# Patient Record
Sex: MALE | Race: WHITE | NOT HISPANIC OR LATINO | Employment: UNEMPLOYED | RURAL
[De-identification: names, ages, dates, MRNs, and addresses within clinical notes are randomized per-mention and may not be internally consistent; named-entity substitution may affect disease eponyms.]

---

## 2017-01-30 RX ORDER — FENOFIBRIC ACID 135 MG/1
135 CAPSULE, DELAYED RELEASE ORAL DAILY
Qty: 30 CAPSULE | Refills: 11 | Status: SHIPPED | OUTPATIENT
Start: 2017-01-30 | End: 2017-08-29

## 2017-01-31 ENCOUNTER — OFFICE VISIT (OUTPATIENT)
Dept: CARDIOLOGY | Facility: CLINIC | Age: 77
End: 2017-01-31

## 2017-01-31 VITALS
SYSTOLIC BLOOD PRESSURE: 130 MMHG | BODY MASS INDEX: 31.48 KG/M2 | HEIGHT: 64 IN | HEART RATE: 76 BPM | WEIGHT: 184.4 LBS | DIASTOLIC BLOOD PRESSURE: 81 MMHG

## 2017-01-31 DIAGNOSIS — I10 ESSENTIAL HYPERTENSION: ICD-10-CM

## 2017-01-31 DIAGNOSIS — E78.5 HYPERLIPIDEMIA LDL GOAL <100: ICD-10-CM

## 2017-01-31 DIAGNOSIS — I25.10 CORONARY ARTERY DISEASE INVOLVING NATIVE CORONARY ARTERY OF NATIVE HEART WITHOUT ANGINA PECTORIS: Primary | ICD-10-CM

## 2017-01-31 PROCEDURE — 99213 OFFICE O/P EST LOW 20 MIN: CPT | Performed by: INTERNAL MEDICINE

## 2017-01-31 RX ORDER — LORATADINE 10 MG/1
10 TABLET ORAL DAILY
COMMUNITY

## 2017-01-31 RX ORDER — ROSUVASTATIN CALCIUM 20 MG/1
20 TABLET, COATED ORAL DAILY
Qty: 30 TABLET | Refills: 6 | Status: SHIPPED | OUTPATIENT
Start: 2017-01-31 | End: 2017-08-29

## 2017-01-31 NOTE — PROGRESS NOTES
Encounter Date:01/31/2017      Patient ID: Chino Bailey is a 76 y.o. male.    Chief Complaint: Coronary Artery Disease; Hypertension; and Hyperlipidemia    PROBLEM LIST:    1.  Coronary artery disease:  a. Anterolateral ST segment elevation  MI in September 2004.  b. Cardiac catheterization showed ejection fraction of 50% with moderate inferior hypokinesis, 80% mid LAD stenosis with 95% first diagonal stenosis, 70% obtuse marginal and 70% mid to distal circumflex.  c. Stenting of the first  diagonal with 2.5 x 13 mm CYPHER stent and stenting of the LAD with 3.0 x 18 mm CYPHER stent by Dr. Mireles on 17 September, 2004.  d. Stenting of the distal circumflex with 2.5 x 24 TAXUS stent with stenting of the first marginal with 2.5 x 20 mm TAXUS  stent by Dr. Johnson on 5 October, 2004.  e. Exercise Cardiolite stress test in March 2006 was negative for ischemia and showed ejection fraction of 64%.  f. Cardiac catheterization by Dr. Johnson on 06/21/2007 showed 70% focal mid-segment LAD stenosis with  99% stenosis of the small proximal right PDA, ejection fraction is normal, all previous stents were patent.  g. Subsequent stenting of the mid LAD with 3.0 x 8 TAXUS stent and PDA was treated with balloon angioplasty with excellent results.  h. Stable  angina.  i. Cardiolite stress test on 03/22/2011:  Negative for ischemia and showed EF of 69%.    j. Recurrent angina with hospital presentation to [facility]; MI excluded.  k. Cardiolite stress test on 11/19/2013:  Negative for ischemia and  showed normal LV function.    l. Cardiac catheterization, with presentation of UA 12/15/16: Severe one vessel CAD. 95% stenosis of first OM branch of LCx. Moderate disease of LAD and RCA., LCx. BETH to first OM (Xience 2.5 x 12 mm)  2. Benign HTN.  3. Mixed hypercholesterolemia  with intolerance to statin, subhash jaundice, due to elevated LFTs.  4. Osteoarthritis.  5. Seasonal allergies.  6. GERD.  7. Status post  cholecystectomy.  8. Proteinuria.  9. Chronic kidney disease, stage 3.    History of Present Illness  Patient presents today status post cardiac catheterization.  Since last visit has been feeling much better. Denies chest pain, shortness of breath, leg swelling, palpitations, and syncope.  Intolerant to previous statin therapy.  Remains busy and active.  That his insurance is refusing to pay for Trilipix.  He has had previous problems with Lipitor but has not tried any other statins.    Allergies   Allergen Reactions   • Statins Other (See Comments)     Skin turned yellow per p,t jaundice         Current Outpatient Prescriptions:   •  aspirin 81 MG chewable tablet, Chew 1 tablet Daily for 360 days., Disp: 90 tablet, Rfl: 3  •  clopidogrel (PLAVIX) 75 MG tablet, Take 1 tablet by mouth Daily for 360 days., Disp: 90 tablet, Rfl: 3  •  esomeprazole (NexIUM) 40 MG capsule, Take 40 mg by mouth every morning before breakfast., Disp: , Rfl:   •  ezetimibe (ZETIA) 10 MG tablet, Take 1 tablet by mouth daily., Disp: 30 tablet, Rfl: 11  •  fenofibric acid (TRILIPIX) 135 MG capsule delayed-release delayed release capsule, Take 1 capsule by mouth Daily., Disp: 30 capsule, Rfl: 11  •  fluticasone (FLONASE) 50 MCG/ACT nasal spray, 2 sprays into each nostril daily. Administer 2 sprays in each nostril for each dose., Disp: , Rfl:   •  hydrOXYzine (VISTARIL) 50 MG capsule, Take 50 mg by mouth 2 (two) times a day., Disp: , Rfl:   •  isosorbide mononitrate (IMDUR) 30 MG 24 hr tablet, Take 1 tablet by mouth daily., Disp: 30 tablet, Rfl: 11  •  loratadine (CLARITIN) 10 MG tablet, Take 10 mg by mouth Daily., Disp: , Rfl:   •  metoprolol tartrate (LOPRESSOR) 50 MG tablet, Take 1 tablet by mouth 2 (two) times a day., Disp: 60 tablet, Rfl: 11  •  niacin (NIASPAN) 500 MG CR tablet, Take 1 tablet by mouth every night., Disp: 30 tablet, Rfl: 1  •  nisoldipine (SULAR) 8.5 MG 24 hr tablet, Take 1 tablet by mouth daily., Disp: 30 tablet, Rfl:  "11  •  nitroglycerin (NITROSTAT) 0.4 MG SL tablet, Place 1 tablet under the tongue Every 5 (Five) Minutes As Needed for chest pain. Take no more than 3 doses in 15 minutes., Disp: 30 tablet, Rfl: 12  •  rosuvastatin (CRESTOR) 20 MG tablet, Take 1 tablet by mouth Daily., Disp: 30 tablet, Rfl: 6    The following portions of the patient's history were reviewed and updated as appropriate: allergies, current medications, past family history, past medical history, past social history, past surgical history and problem list.    Review of Systems   Constitution: Negative for chills, fever, weight gain and weight loss.   Cardiovascular: Negative for chest pain, claudication, dyspnea on exertion, leg swelling, orthopnea, palpitations, paroxysmal nocturnal dyspnea and syncope.        No dizziness   Gastrointestinal: Negative for abdominal pain, constipation, diarrhea, nausea and vomiting.   Genitourinary:        No urinary symptoms   Neurological:        No symptoms of stroke.   All other systems reviewed and are negative.    Objective:     Blood pressure 130/81, pulse 76, height 64\" (162.6 cm), weight 184 lb 6.4 oz (83.6 kg).      Physical Exam   Constitutional: He appears well-developed and well-nourished.   HENT:   HEENT exam unremarkable.   Neck: Neck supple. No JVD present.   No carotid bruits.   Cardiovascular: Normal rate, regular rhythm and normal heart sounds.    No murmur heard.  2 plus symmetric pulses.   Pulmonary/Chest: Breath sounds normal. He exhibits no tenderness.   Abdominal:   Abdomen benign.   Musculoskeletal: He exhibits no edema.   Neurological:   Neurological exam unremarkable.   Vitals reviewed.    [facility] 12/16/16: WNL  HbA1c 6.10  Lipid panel    TG 96  HDL 51      Procedures       Assessment:      Diagnosis Plan   1. Coronary artery disease involving native coronary artery of native heart without angina pectoris     2. Essential hypertension     3. Hyperlipidemia LDL goal <100       Plan: "   Start Rosuvastatin 20 mg once daily.   Check FLP and CMP in 1 month.  Continue all other current medications.   FU in 6 MO, sooner as needed.  Thank you for allowing us to participate in the care of your patient.     Scribed for Tony Johnson MD by Andres Mendoza. 1/31/2017  1:35 PM    ITony MD, personally performed the services described in this documentation as scribed by the above named individual in my presence, and it is both accurate and complete.  1/31/2017  1:35 PM        Please note that portions of this note may have been completed with a voice recognition program. Efforts were made to edit the dictations, but occasionally words are mistranscribed.

## 2017-06-22 ENCOUNTER — TELEPHONE (OUTPATIENT)
Dept: CARDIOLOGY | Facility: CLINIC | Age: 77
End: 2017-06-22

## 2017-08-29 ENCOUNTER — OFFICE VISIT (OUTPATIENT)
Dept: CARDIOLOGY | Facility: CLINIC | Age: 77
End: 2017-08-29

## 2017-08-29 VITALS
DIASTOLIC BLOOD PRESSURE: 74 MMHG | WEIGHT: 186.8 LBS | BODY MASS INDEX: 31.89 KG/M2 | HEART RATE: 61 BPM | SYSTOLIC BLOOD PRESSURE: 122 MMHG | HEIGHT: 64 IN

## 2017-08-29 DIAGNOSIS — I10 ESSENTIAL HYPERTENSION: ICD-10-CM

## 2017-08-29 DIAGNOSIS — I25.110 CORONARY ARTERY DISEASE INVOLVING NATIVE CORONARY ARTERY OF NATIVE HEART WITH UNSTABLE ANGINA PECTORIS (HCC): Primary | ICD-10-CM

## 2017-08-29 DIAGNOSIS — E78.5 HYPERLIPIDEMIA LDL GOAL <100: ICD-10-CM

## 2017-08-29 PROCEDURE — 99214 OFFICE O/P EST MOD 30 MIN: CPT | Performed by: INTERNAL MEDICINE

## 2017-08-29 RX ORDER — NIACIN 500 MG/1
500 TABLET, EXTENDED RELEASE ORAL NIGHTLY
Qty: 30 TABLET | Refills: 1 | Status: SHIPPED | OUTPATIENT
Start: 2017-08-29 | End: 2019-05-21

## 2017-08-29 RX ORDER — NISOLDIPINE 8.5 MG/1
8.5 TABLET, FILM COATED, EXTENDED RELEASE ORAL DAILY
Qty: 30 TABLET | Refills: 11 | Status: SHIPPED | OUTPATIENT
Start: 2017-08-29 | End: 2018-03-06 | Stop reason: SDUPTHER

## 2017-08-29 RX ORDER — METOPROLOL SUCCINATE 50 MG/1
50 TABLET, EXTENDED RELEASE ORAL DAILY
Qty: 60 TABLET | Refills: 11 | Status: SHIPPED | OUTPATIENT
Start: 2017-08-29 | End: 2018-03-06 | Stop reason: SDUPTHER

## 2017-08-29 RX ORDER — ISOSORBIDE MONONITRATE 30 MG/1
30 TABLET, EXTENDED RELEASE ORAL DAILY
Qty: 30 TABLET | Refills: 11 | Status: SHIPPED | OUTPATIENT
Start: 2017-08-29 | End: 2018-03-06 | Stop reason: SDUPTHER

## 2017-08-29 RX ORDER — EZETIMIBE 10 MG/1
10 TABLET ORAL DAILY
Qty: 30 TABLET | Refills: 11 | Status: SHIPPED | OUTPATIENT
Start: 2017-08-29 | End: 2018-03-06 | Stop reason: SDUPTHER

## 2017-08-29 RX ORDER — CLOPIDOGREL BISULFATE 75 MG/1
75 TABLET ORAL DAILY
Qty: 90 TABLET | Refills: 3 | Status: SHIPPED | OUTPATIENT
Start: 2017-08-29 | End: 2018-03-06 | Stop reason: SDUPTHER

## 2017-08-29 NOTE — PROGRESS NOTES
Encounter Date:08/29/2017      Patient ID: Chino Bailey is a 76 y.o. male.    Chief Complaint: Coronary Artery Disease    PROBLEM LIST:    1.  Coronary artery disease:  a. Anterolateral ST segment elevation  MI in September 2004.  b. Cardiac catheterization showed ejection fraction of 50% with moderate inferior hypokinesis, 80% mid LAD stenosis with 95% first diagonal stenosis, 70% obtuse marginal and 70% mid to distal circumflex.  c. Stenting of the first  diagonal with 2.5 x 13 mm CYPHER stent and stenting of the LAD with 3.0 x 18 mm CYPHER stent by Dr. Mireles on 17 September, 2004.  d. Stenting of the distal circumflex with 2.5 x 24 TAXUS stent with stenting of the first marginal with 2.5 x 20 mm TAXUS  stent by Dr. Johnson on 5 October, 2004.  e. Exercise Cardiolite stress test in March 2006 was negative for ischemia and showed ejection fraction of 64%.  f. Cardiac catheterization by Dr. Johnson on 06/21/2007 showed 70% focal mid-segment LAD stenosis with  99% stenosis of the small proximal right PDA, ejection fraction is normal, all previous stents were patent.  g. Subsequent stenting of the mid LAD with 3.0 x 8 TAXUS stent and PDA was treated with balloon angioplasty with excellent results.  h. Stable angina.  i. Cardiolite stress test on 03/22/2011:  Negative for ischemia and showed EF of 69%.    j. Recurrent angina with hospital presentation to [facility]; MI excluded.  k. Cardiolite stress test on 11/19/2013:  Negative for ischemia and  showed normal LV function.    l. Cardiac catheterization, with presentation of UA 12/15/16: Severe one vessel CAD. 95% stenosis of first OM branch of LCx. Moderate disease of LAD and RCA., LCx. BETH to first OM (Xience 2.5 x 12 mm)  2. Benign HTN.  3. Mixed hypercholesterolemia  with intolerance to statin, subhash jaundice, due to elevated LFTs.  4. Osteoarthritis.  5. Seasonal allergies.  6. GERD.  7. Status post cholecystectomy.  8. Proteinuria.  9. Chronic  kidney disease, stage 3.    History of Present Illness  Patient presents today for follow-up with a history of CAD and cardiac risk factors. He has been feeling great from a cardiac standpoint. Notes statin intolerance, was most recently started on Crestor following discontinuation of fenofibric acid due to insurance reasons. this caused severe joint and muscle aches.  He has previously discontinued Lipitor Crestor and some other statins due to similar reasons.  States that cholesterol repeated recently was not on target, he is in the process of getting injectable cholesterol treatment approval from her insurance.  Reports mild occasional leg swelling which resolves with leg elevation Denies chest pain, shortness of breath, palpitations, and syncope. Remains busy and active as tolerated.    Allergies   Allergen Reactions   • Statins Other (See Comments)     Skin turned yellow per p,t jaundice         Current Outpatient Prescriptions:   •  aspirin 81 MG chewable tablet, Chew 1 tablet Daily for 360 days., Disp: 90 tablet, Rfl: 3  •  clopidogrel (PLAVIX) 75 MG tablet, Take 1 tablet by mouth Daily for 360 days., Disp: 90 tablet, Rfl: 3  •  esomeprazole (NexIUM) 40 MG capsule, Take 40 mg by mouth every morning before breakfast., Disp: , Rfl:   •  ezetimibe (ZETIA) 10 MG tablet, Take 1 tablet by mouth daily., Disp: 30 tablet, Rfl: 11  •  fenofibric acid (TRILIPIX) 135 MG capsule delayed-release delayed release capsule, Take 1 capsule by mouth Daily., Disp: 30 capsule, Rfl: 11  •  fluticasone (FLONASE) 50 MCG/ACT nasal spray, 2 sprays into each nostril daily. Administer 2 sprays in each nostril for each dose., Disp: , Rfl:   •  hydrOXYzine (VISTARIL) 50 MG capsule, Take 50 mg by mouth 2 (two) times a day., Disp: , Rfl:   •  isosorbide mononitrate (IMDUR) 30 MG 24 hr tablet, Take 1 tablet by mouth daily., Disp: 30 tablet, Rfl: 11  •  loratadine (CLARITIN) 10 MG tablet, Take 10 mg by mouth Daily., Disp: , Rfl:   •   "metoprolol tartrate (LOPRESSOR) 50 MG tablet, Take 1 tablet by mouth 2 (two) times a day., Disp: 60 tablet, Rfl: 11  •  niacin (NIASPAN) 500 MG CR tablet, Take 1 tablet by mouth every night., Disp: 30 tablet, Rfl: 1  •  nisoldipine (SULAR) 8.5 MG 24 hr tablet, Take 1 tablet by mouth daily., Disp: 30 tablet, Rfl: 11  •  nitroglycerin (NITROSTAT) 0.4 MG SL tablet, Place 1 tablet under the tongue Every 5 (Five) Minutes As Needed for chest pain. Take no more than 3 doses in 15 minutes., Disp: 30 tablet, Rfl: 12    The following portions of the patient's history were reviewed and updated as appropriate: allergies, current medications, past family history, past medical history, past social history, past surgical history and problem list.    Review of Systems   Constitution: Negative for chills, fever, weight gain and weight loss.   Cardiovascular: Positive for leg swelling. Negative for chest pain, claudication, dyspnea on exertion, orthopnea, palpitations, paroxysmal nocturnal dyspnea and syncope.        No dizziness   Gastrointestinal: Negative for abdominal pain, constipation, diarrhea, nausea and vomiting.   Genitourinary:        No urinary symptoms   Neurological:        No symptoms of stroke.   All other systems reviewed and are negative.    Objective:     Blood pressure 122/74, pulse 61, height 64\" (162.6 cm), weight 186 lb 12.8 oz (84.7 kg).      Physical Exam  Constitutional: She appears well-developed and well-nourished.   HENT:   HEENT exam unremarkable.   Neck: Neck supple. No JVD present.   No carotid bruits.   Cardiovascular: Normal rate, regular rhythm and normal heart sounds.    No murmur heard.  2 plus symmetric pulses.   Pulmonary/Chest: Breath sounds normal. Does not exhibit tenderness.   Abdominal:   Abdomen benign.   Musculoskeletal: Does not exhibit edema.   Neurological:   Neurological exam unremarkable.   Vitals reviewed.    Lab Review 2/24/2017:  Lipid Panel      HDL 52  LDL 76  CMP, " Cr 1.31 otherwise WNL    Procedures       Assessment:      Diagnosis Plan   1. Coronary artery disease involving native coronary artery of native heart with unstable angina pectoris, stable and asymptomatic    2. Essential hypertension, well controlled.    3. Hyperlipidemia LDL goal <100  Check FLP followed by preauth for PCSK9 inhibitor depending on results.     Plan:   Check FLP and CMP.   Stable cardiac status.  Continue current medications.   FU in 6 MO, sooner as needed.  Thank you for allowing us to participate in the care of your patient.     Scribed for Tony Johnson MD by Andres Mendoza. 8/29/2017  9:48 AM    I, Tony Johnson MD, personally performed the services described in this documentation as scribed by the above named individual in my presence, and it is both accurate and complete.  8/29/2017  10:16 AM        Please note that portions of this note may have been completed with a voice recognition program. Efforts were made to edit the dictations, but occasionally words are mistranscribed.

## 2017-12-20 DIAGNOSIS — I25.110 CORONARY ARTERY DISEASE INVOLVING NATIVE CORONARY ARTERY OF NATIVE HEART WITH UNSTABLE ANGINA PECTORIS (HCC): ICD-10-CM

## 2017-12-20 RX ORDER — ASPIRIN 81 MG/1
81 TABLET, CHEWABLE ORAL DAILY
Qty: 90 TABLET | Refills: 3 | Status: SHIPPED | OUTPATIENT
Start: 2017-12-20 | End: 2018-05-15 | Stop reason: ALTCHOICE

## 2018-03-06 DIAGNOSIS — I25.110 CORONARY ARTERY DISEASE INVOLVING NATIVE CORONARY ARTERY OF NATIVE HEART WITH UNSTABLE ANGINA PECTORIS (HCC): ICD-10-CM

## 2018-03-06 RX ORDER — NISOLDIPINE 8.5 MG/1
8.5 TABLET, FILM COATED, EXTENDED RELEASE ORAL DAILY
Qty: 30 TABLET | Refills: 11 | Status: SHIPPED | OUTPATIENT
Start: 2018-03-06 | End: 2018-08-17 | Stop reason: CLARIF

## 2018-03-06 RX ORDER — EZETIMIBE 10 MG/1
10 TABLET ORAL DAILY
Qty: 30 TABLET | Refills: 11 | Status: SHIPPED | OUTPATIENT
Start: 2018-03-06 | End: 2018-05-15 | Stop reason: HOSPADM

## 2018-03-06 RX ORDER — METOPROLOL SUCCINATE 50 MG/1
50 TABLET, EXTENDED RELEASE ORAL DAILY
Qty: 60 TABLET | Refills: 11 | Status: SHIPPED | OUTPATIENT
Start: 2018-03-06 | End: 2020-07-28 | Stop reason: SDUPTHER

## 2018-03-06 RX ORDER — ISOSORBIDE MONONITRATE 30 MG/1
30 TABLET, EXTENDED RELEASE ORAL DAILY
Qty: 30 TABLET | Refills: 11 | Status: SHIPPED | OUTPATIENT
Start: 2018-03-06 | End: 2020-07-28 | Stop reason: SDUPTHER

## 2018-03-06 RX ORDER — CLOPIDOGREL BISULFATE 75 MG/1
75 TABLET ORAL DAILY
Qty: 90 TABLET | Refills: 3 | Status: SHIPPED | OUTPATIENT
Start: 2018-03-06 | End: 2018-05-15

## 2018-05-15 ENCOUNTER — OFFICE VISIT (OUTPATIENT)
Dept: CARDIOLOGY | Facility: CLINIC | Age: 78
End: 2018-05-15

## 2018-05-15 VITALS
HEART RATE: 56 BPM | BODY MASS INDEX: 32.1 KG/M2 | DIASTOLIC BLOOD PRESSURE: 66 MMHG | WEIGHT: 188 LBS | HEIGHT: 64 IN | SYSTOLIC BLOOD PRESSURE: 140 MMHG

## 2018-05-15 DIAGNOSIS — I10 ESSENTIAL HYPERTENSION: ICD-10-CM

## 2018-05-15 DIAGNOSIS — I25.110 CORONARY ARTERY DISEASE INVOLVING NATIVE CORONARY ARTERY OF NATIVE HEART WITH UNSTABLE ANGINA PECTORIS (HCC): Primary | ICD-10-CM

## 2018-05-15 DIAGNOSIS — E78.5 HYPERLIPIDEMIA LDL GOAL <100: ICD-10-CM

## 2018-05-15 PROCEDURE — 99214 OFFICE O/P EST MOD 30 MIN: CPT | Performed by: INTERNAL MEDICINE

## 2018-05-15 RX ORDER — ASPIRIN 325 MG
325 TABLET ORAL DAILY
Qty: 100 TABLET | Refills: 3 | COMMUNITY
End: 2020-07-28 | Stop reason: SDUPTHER

## 2018-05-15 NOTE — PROGRESS NOTES
Encounter Date:05/15/2018      Patient ID: Chino Bailey is a 77 y.o. male.    Chief Complaint: Coronary Artery Disease      PROBLEM LIST:  1.  Coronary artery disease:  a. Anterolateral ST segment elevation  MI in September 2004.  b. Cardiac catheterization showed ejection fraction of 50% with moderate inferior hypokinesis, 80% mid LAD stenosis with 95% first diagonal stenosis, 70% obtuse marginal and 70% mid to distal circumflex.  c. Stenting of the first  diagonal with 2.5 x 13 mm CYPHER stent and stenting of the LAD with 3.0 x 18 mm CYPHER stent by Dr. Mireles on 17 September, 2004.  d. Stenting of the distal circumflex with 2.5 x 24 TAXUS stent with stenting of the first marginal with 2.5 x 20 mm TAXUS  stent by Dr. Johnson on 5 October, 2004.  e. Exercise Cardiolite stress test in March 2006 was negative for ischemia and showed ejection fraction of 64%.  f. Cardiac catheterization by Dr. Johnson on 06/21/2007 showed 70% focal mid-segment LAD stenosis with  99% stenosis of the small proximal right PDA, ejection fraction is normal, all previous stents were patent.  g. Subsequent stenting of the mid LAD with 3.0 x 8 TAXUS stent and PDA was treated with balloon angioplasty with excellent results.  h. Stable angina.  i. Cardiolite stress test on 03/22/2011:  Negative for ischemia and showed EF of 69%.    j. Recurrent angina with hospital presentation to [facility]; MI excluded.  k. Cardiolite stress test on 11/19/2013:  Negative for ischemia and  showed normal LV function.    l. Cardiac catheterization, with presentation of UA 12/15/16: Severe one vessel CAD. 95% stenosis of first OM branch of LCx. Moderate disease of LAD and RCA., LCx. BETH to first OM (Xience 2.5 x 12 mm)  2. Benign HTN.  3. Mixed hypercholesterolemia  with intolerance to statin, subhash jaundice, due to elevated LFTs.  4. Osteoarthritis.  5. Seasonal allergies.  6. GERD.  7. Status post cholecystectomy.  8. Proteinuria.  9. Chronic  kidney disease, stage 3.    History of Present Illness  Patient presents today for follow-up with a history of CAD and cardiac risk factors. Since last visit has been ding well from a cardiac standpoint. Is no longer taking Zetia since starting Alirocumab. Denies chest pain, shortness of breath, leg swelling, palpitations, and syncope. Remains busy and active with no limitations    Allergies   Allergen Reactions   • Statins Other (See Comments)     Skin turned yellow per p,t jaundice         Current Outpatient Prescriptions:   •  Alirocumab 75 MG/ML solution pen-injector, Inject 75 mg under the skin Every 14 (Fourteen) Days., Disp: 2 pen, Rfl: 11  •  aspirin 81 MG chewable tablet, Chew 1 tablet Daily for 360 days., Disp: 90 tablet, Rfl: 3  •  clopidogrel (PLAVIX) 75 MG tablet, Take 1 tablet by mouth Daily., Disp: 90 tablet, Rfl: 3  •  esomeprazole (NexIUM) 40 MG capsule, Take 40 mg by mouth every morning before breakfast., Disp: , Rfl:   •  fluticasone (FLONASE) 50 MCG/ACT nasal spray, 2 sprays into each nostril daily. Administer 2 sprays in each nostril for each dose., Disp: , Rfl:   •  hydrOXYzine (VISTARIL) 50 MG capsule, Take 50 mg by mouth 2 (two) times a day., Disp: , Rfl:   •  isosorbide mononitrate (IMDUR) 30 MG 24 hr tablet, Take 1 tablet by mouth Daily., Disp: 30 tablet, Rfl: 11  •  loratadine (CLARITIN) 10 MG tablet, Take 10 mg by mouth Daily., Disp: , Rfl:   •  metoprolol succinate XL (TOPROL-XL) 50 MG 24 hr tablet, Take 1 tablet by mouth Daily., Disp: 60 tablet, Rfl: 11  •  niacin (NIASPAN) 500 MG CR tablet, Take 1 tablet by mouth Every Night., Disp: 30 tablet, Rfl: 1  •  nisoldipine (SULAR) 8.5 MG 24 hr tablet, Take 1 tablet by mouth Daily., Disp: 30 tablet, Rfl: 11  •  nitroglycerin (NITROSTAT) 0.4 MG SL tablet, Place 1 tablet under the tongue Every 5 (Five) Minutes As Needed for chest pain. Take no more than 3 doses in 15 minutes., Disp: 30 tablet, Rfl: 12    The following portions of the patient's  "history were reviewed and updated as appropriate: allergies, current medications, past family history, past medical history, past social history, past surgical history and problem list.    ROS  Review of Systems   Constitution: Negative for chills, fever, weight gain and weight loss.   Cardiovascular: Negative for chest pain, claudication, dyspnea on exertion, leg swelling, orthopnea, palpitations, paroxysmal nocturnal dyspnea and syncope.        No dizziness   Gastrointestinal: Negative for abdominal pain, constipation, diarrhea, nausea and vomiting.   Genitourinary:        No urinary symptoms   Neurological:        No symptoms of stroke.   All other systems reviewed and are negative.    Objective:     Blood pressure 140/66, pulse 56, height 162.6 cm (64\"), weight 85.3 kg (188 lb).  Repeat blood pressure measurement by, Tony Johnson MD, at 138/80.      Physical Exam  Constitutional: She appears well-developed and well-nourished.   HENT:   HEENT exam unremarkable.   Neck: Neck supple. No JVD present.   No carotid bruits.   Cardiovascular: Normal rate, regular rhythm and normal heart sounds.    No murmur heard.  2 plus symmetric pulses.   Pulmonary/Chest: Breath sounds normal. Does not exhibit tenderness.   Abdominal:   Abdomen benign.   Musculoskeletal: Does not exhibit edema.   Neurological:   Neurological exam unremarkable.   Vitals reviewed.    Lab Review:     Glucose 114  Rest of CMP within normal limits  Total cholesterol 97  HDL 40  LDL 93  TSH normal  HA1c 6  CBC normal  Procedures       Assessment:      Diagnosis Plan   1. Coronary artery disease involving native coronary artery of native heart with unstable angina pectoris  Stable. No angina.   2. Essential hypertension  Well controlled with current medication regimen.   3. Hyperlipidemia. LDL goal < 70 Continue Alirocumab 150 mg injections every 14 days     Plan:   Stable cardiac status.  Increase Praluent to 150mg every 14 days  Continue rest of the " current medications.   FU in 12 MO, sooner as needed.  Thank you for allowing us to participate in the care of your patient.     Scribed for Tony Johnson MD by Jayesh Mendoza. 5/15/2018  9:49 AM     I, Tony Johnson MD, personally performed the services described in this documentation as scribed by the above named individual in my presence, and it is both accurate and complete.  5/15/2018  10:44 AM    Please note that portions of this note may have been completed with a voice recognition program. Efforts were made to edit the dictations, but occasionally words are mistranscribed.

## 2018-08-16 ENCOUNTER — TELEPHONE (OUTPATIENT)
Dept: CARDIOLOGY | Facility: CLINIC | Age: 78
End: 2018-08-16

## 2018-08-16 NOTE — TELEPHONE ENCOUNTER
Coverage for Nisoldipine ER tablets have been denied by patient's insurance.  They are requiring trial of formulary alternatives felodipine, afeditab, or nifedipine.      Patient is currently taking nisoldipine 8.5mg daily.  Please advise.

## 2018-08-17 RX ORDER — AMLODIPINE BESYLATE 5 MG/1
5 TABLET ORAL DAILY
Qty: 30 TABLET | Refills: 5 | Status: SHIPPED | OUTPATIENT
Start: 2018-08-17 | End: 2019-02-08 | Stop reason: SDUPTHER

## 2019-02-08 RX ORDER — AMLODIPINE BESYLATE 5 MG/1
5 TABLET ORAL DAILY
Qty: 30 TABLET | Refills: 5 | Status: SHIPPED | OUTPATIENT
Start: 2019-02-08 | End: 2019-05-21

## 2019-05-21 ENCOUNTER — OFFICE VISIT (OUTPATIENT)
Dept: CARDIOLOGY | Facility: CLINIC | Age: 79
End: 2019-05-21

## 2019-05-21 VITALS
DIASTOLIC BLOOD PRESSURE: 70 MMHG | SYSTOLIC BLOOD PRESSURE: 130 MMHG | HEART RATE: 50 BPM | HEIGHT: 64 IN | BODY MASS INDEX: 31.76 KG/M2 | WEIGHT: 186 LBS

## 2019-05-21 DIAGNOSIS — E78.5 HYPERLIPIDEMIA LDL GOAL <70: ICD-10-CM

## 2019-05-21 DIAGNOSIS — I25.110 CORONARY ARTERY DISEASE INVOLVING NATIVE CORONARY ARTERY OF NATIVE HEART WITH UNSTABLE ANGINA PECTORIS (HCC): Primary | ICD-10-CM

## 2019-05-21 DIAGNOSIS — I10 ESSENTIAL HYPERTENSION: ICD-10-CM

## 2019-05-21 PROCEDURE — 99214 OFFICE O/P EST MOD 30 MIN: CPT | Performed by: PHYSICIAN ASSISTANT

## 2019-05-21 RX ORDER — MONTELUKAST SODIUM 10 MG/1
10 TABLET ORAL NIGHTLY
COMMUNITY
End: 2021-08-03

## 2019-05-21 RX ORDER — CHLORTHALIDONE 25 MG/1
25 TABLET ORAL DAILY
COMMUNITY
End: 2022-11-02 | Stop reason: SDUPTHER

## 2019-05-21 NOTE — PROGRESS NOTES
Encounter Date:05/21/2019      Patient ID: Chino Bailey is a 78 y.o. male.    Alma Chávez DO    Chief Complaint: Coronary Artery Disease      PROBLEM LIST:  Patient Active Problem List    Diagnosis Date Noted   • Coronary artery disease involving native coronary artery of native heart with unstable angina pectoris (CMS/Formerly [facility])      Priority: High     Note Last Updated: 12/16/2016     · Cardiac catheterization for STEMI (9/2004): PCI to LAD.  LVEF 50%  · Cardiac catheterization (10/20 04): PCI to distal circumflex and first OM  · Cardiac catheterization (6/21/2007): PCI to mid LAD and angioplasty to RPDA  · Normal nuclear stress test, 11/19/2013  · Cardiac catheterization for unstable angina (12/15/16): Severe one vessel coronary artery disease involving a 95% stenosis of the OM1 status post BETH.  Moderate disease of the mid LAD and RCA.  Previously placed stents with mild ISR.     • Hyperlipidemia LDL goal <70      Priority: Medium     Note Last Updated: 5/21/2019     · Statin-induced liver injury       • Essential hypertension      Priority: Medium   • Granulomatous lung disease (CMS/Formerly [facility])    • Polypharmacy    • Osteoarthritis    • Seasonal allergies    • GERD (gastroesophageal reflux disease)    • Chronic kidney disease, stage 3 (CMS/Formerly [facility])            History of Present Illness  Patient presents today for follow-up with a history of CAD, hypertension, dyslipidemia with statin intolerance.  Since last year he is done very well from a cardiac standpoint.  He has no current complaint of exertional chest pain.  His chronic dyspnea remains at baseline.  He denies orthopnea, PND, claudication.  He has had some recent lower extremity edema for which he was prescribed chlorthalidone by his nephrologist.  He does admit to stopping medication after edema improved.  His blood pressures at home average about 130 mmHg systolic.  He reports having had a lipid panel approximately 2 months ago through primary care  which is not available for review.  He continues his PCSK9 inhibitor.    Allergies   Allergen Reactions   • Statins Other (See Comments)     Skin turned yellow per p,t jaundice         Current Outpatient Medications:   •  Alirocumab 150 MG/ML solution pen-injector, Inject 150 mg under the skin Every 14 (Fourteen) Days., Disp: 2 pen, Rfl: 11  •  aspirin 325 MG tablet, Take 1 tablet by mouth Daily., Disp: 100 tablet, Rfl: 3  •  chlorthalidone (HYGROTON) 25 MG tablet, Take 25 mg by mouth Daily., Disp: , Rfl:   •  esomeprazole (NexIUM) 40 MG capsule, Take 40 mg by mouth every morning before breakfast., Disp: , Rfl:   •  fluticasone (FLONASE) 50 MCG/ACT nasal spray, 2 sprays into each nostril daily. Administer 2 sprays in each nostril for each dose., Disp: , Rfl:   •  hydrOXYzine (VISTARIL) 50 MG capsule, Take 50 mg by mouth 2 (two) times a day., Disp: , Rfl:   •  isosorbide mononitrate (IMDUR) 30 MG 24 hr tablet, Take 1 tablet by mouth Daily., Disp: 30 tablet, Rfl: 11  •  loratadine (CLARITIN) 10 MG tablet, Take 10 mg by mouth Daily., Disp: , Rfl:   •  metoprolol succinate XL (TOPROL-XL) 50 MG 24 hr tablet, Take 1 tablet by mouth Daily., Disp: 60 tablet, Rfl: 11  •  montelukast (SINGULAIR) 10 MG tablet, Take 10 mg by mouth Every Night., Disp: , Rfl:   •  nitroglycerin (NITROSTAT) 0.4 MG SL tablet, Place 1 tablet under the tongue Every 5 (Five) Minutes As Needed for chest pain. Take no more than 3 doses in 15 minutes., Disp: 30 tablet, Rfl: 12    The following portions of the patient's history were reviewed and updated as appropriate: allergies, current medications, past family history, past medical history, past social history, past surgical history and problem list.    Review of Systems   Constitution: Negative for diaphoresis, weakness, malaise/fatigue and weight gain.   Cardiovascular: Positive for leg swelling. Negative for chest pain, claudication, dyspnea on exertion, irregular heartbeat, orthopnea, palpitations,  "paroxysmal nocturnal dyspnea and syncope.   Respiratory: Positive for shortness of breath. Negative for cough, sleep disturbances due to breathing and wheezing.    Hematologic/Lymphatic: Negative for bleeding problem.   Musculoskeletal: Negative for muscle cramps, muscle weakness and myalgias.   Gastrointestinal: Negative for heartburn.     .    Objective:     /70   Pulse 50   Ht 162.6 cm (64\")   Wt 84.4 kg (186 lb)   BMI 31.93 kg/m²    Body mass index is 31.93 kg/m².     Physical Exam   Constitutional: He is oriented to person, place, and time. He appears well-developed and well-nourished.   Cardiovascular: Normal rate, regular rhythm, normal heart sounds and intact distal pulses. Exam reveals no gallop and no friction rub.   No murmur heard.  Pulmonary/Chest: Effort normal and breath sounds normal. No respiratory distress. He has no wheezes. He has no rales. He exhibits no tenderness.   Bases clear   Musculoskeletal: Normal range of motion. He exhibits edema.   Neurological: He is alert and oriented to person, place, and time.       Lab Review:                 We received his lipid panel from primary care dated March 8, 2019:  Total cholesterol 130, triglycerides 277, HDL 34, LDL 41    Procedures             Assessment:      Diagnosis Plan   1. Coronary artery disease involving native coronary artery of native heart with unstable angina pectoris (CMS/HCC)   stable without current anginal symptoms   2. Essential hypertension  Well managed,  continue current therapy.  I have advised him to continue chlorthalidone as prescribed by nephrology     3. Hyperlipidemia LDL goal <70   on PCSK9 inhibitor.  Continue current medical regimen     Plan:     Stable cardiac status.  Continue current medications.   in 1 year, sooner as needed.  Thank you for allowing us to participate in the care of your patient.     Electronically signed by BRAD Weinstein, 05/21/19, 9:44 AM.      Please note that portions of this " note may have been completed with a voice recognition program. Efforts were made to edit the dictations, but occasionally words are mistr

## 2019-11-18 NOTE — TELEPHONE ENCOUNTER
Patient is requesting script be sent to another pharmacy as [facility] no longer carries. He will call back if there are any issues.

## 2020-07-28 ENCOUNTER — OFFICE VISIT (OUTPATIENT)
Dept: CARDIOLOGY | Facility: CLINIC | Age: 80
End: 2020-07-28

## 2020-07-28 VITALS
BODY MASS INDEX: 30.9 KG/M2 | DIASTOLIC BLOOD PRESSURE: 68 MMHG | SYSTOLIC BLOOD PRESSURE: 116 MMHG | HEART RATE: 56 BPM | HEIGHT: 64 IN | WEIGHT: 181 LBS

## 2020-07-28 DIAGNOSIS — I10 ESSENTIAL HYPERTENSION: ICD-10-CM

## 2020-07-28 DIAGNOSIS — I25.110 CORONARY ARTERY DISEASE INVOLVING NATIVE CORONARY ARTERY OF NATIVE HEART WITH UNSTABLE ANGINA PECTORIS (HCC): ICD-10-CM

## 2020-07-28 DIAGNOSIS — E78.5 HYPERLIPIDEMIA LDL GOAL <70: ICD-10-CM

## 2020-07-28 DIAGNOSIS — I25.10 CORONARY ARTERY DISEASE INVOLVING NATIVE CORONARY ARTERY OF NATIVE HEART WITHOUT ANGINA PECTORIS: Primary | ICD-10-CM

## 2020-07-28 PROCEDURE — 99214 OFFICE O/P EST MOD 30 MIN: CPT | Performed by: INTERNAL MEDICINE

## 2020-07-28 RX ORDER — NITROGLYCERIN 0.4 MG/1
0.4 TABLET SUBLINGUAL
Qty: 30 TABLET | Refills: 12 | Status: SHIPPED | OUTPATIENT
Start: 2020-07-28 | End: 2022-03-07 | Stop reason: SDUPTHER

## 2020-07-28 RX ORDER — ISOSORBIDE MONONITRATE 30 MG/1
30 TABLET, EXTENDED RELEASE ORAL DAILY
Qty: 90 TABLET | Refills: 3 | Status: SHIPPED | OUTPATIENT
Start: 2020-07-28 | End: 2022-03-07 | Stop reason: SDUPTHER

## 2020-07-28 RX ORDER — ASPIRIN 325 MG
325 TABLET ORAL DAILY
Qty: 90 TABLET | Refills: 3 | Status: SHIPPED | OUTPATIENT
Start: 2020-07-28 | End: 2022-03-07 | Stop reason: SDUPTHER

## 2020-07-28 RX ORDER — METOPROLOL SUCCINATE 50 MG/1
50 TABLET, EXTENDED RELEASE ORAL DAILY
Qty: 90 TABLET | Refills: 3 | Status: SHIPPED | OUTPATIENT
Start: 2020-07-28 | End: 2022-03-07 | Stop reason: SDUPTHER

## 2020-07-28 NOTE — PROGRESS NOTES
[facility]    Encounter Date: 2020    Patient ID: Chino Bailey is a 79 y.o. male.  : 1940     PCP: Alma Chávez DO       Chief Complaint: Coronary Artery Disease      PROBLEM LIST:  1.  Coronary artery disease:  a. Anterolateral ST segment elevation  MI in 2004.  b. [facility], 2004: EF 50% with moderate inferior hypokinesis, 80% mid LAD stenosis with 95% D1 stenosis, 70% OM and 70% mid to distal circumflex.   c. [facility], 2004, Dr. Mireles: D1 s/p 2.5 x 13 mm Cypher stent. LAD s/p 3.0 x 18 mm Cypher.   d. [facility], 10/05/2004, Dr. Johnson: Distal LCx s/p 2.5 x 24 Taxus stent. OM1 s/p 2.5 x 20 mm Taxus stent.  e. Cardiolite GXT, 2006 was negative for ischemia. EF 64%.  f. [facility], 2007, Dr. Johnson: 70% focal mid-segment LAD stenosis s/p 3.0 x 8 Taxus stent. 99% stenosis of the small proximal right PDA s/p balloon angioplasty with excellent results. All previous stents were patent. EF normal.  g. Cardiolite stress test on 2011: Negative for ischemia and showed EF of 69%.    h. Angina with hospital presentation to [facility]; MI excluded.  i. Cardiolite stress test on 2013:  Negative for ischemia and  showed normal LV function.    j. [facility], 12/15/16: Severe one vessel CAD. 95% stenosis of first OM branch of LCx. Moderate disease of LAD and RCA., LCx. BETH to first OM (Xience 2.5 x 12 mm)  2. HTN.  3. Mixed hypercholesterolemia with intolerance to statin, subhash jaundice, due to elevated LFTs.  4. Osteoarthritis.  5. Seasonal allergies.  6. GERD.  7. Status post cholecystectomy.  8. Proteinuria.  9. Chronic kidney disease, stage 3.    History of Present Illness  Patient presents today for a follow-up with a history of coronary artery disease and cardiac risk factors. Since last visit, he has been feeling well overall from a cardiovascular standpoint. He remains busy and active with yard work. He denies any hospitalizations or ED visits.  He reports an excellent lipid panel with his PCP obtained recently. Patient denies chest pain, shortness of breath, palpitations, edema, dizziness, and syncope.      Allergies   Allergen Reactions   • Statins Other (See Comments)     Skin turned yellow per p,t jaundice         Current Outpatient Medications:   •  Alirocumab (Praluent) 150 MG/ML solution auto-injector, Inject 1 pen under the skin into the appropriate area as directed Every 14 (Fourteen) Days. NEED FOLLOW UP APT FOR FURTHER REFILLS, Disp: 2 pen, Rfl: 0  •  aspirin 325 MG tablet, Take 1 tablet by mouth Daily., Disp: 100 tablet, Rfl: 3  •  chlorthalidone (HYGROTON) 25 MG tablet, Take 25 mg by mouth Daily., Disp: , Rfl:   •  esomeprazole (NexIUM) 40 MG capsule, Take 40 mg by mouth every morning before breakfast., Disp: , Rfl:   •  fluticasone (FLONASE) 50 MCG/ACT nasal spray, 2 sprays into each nostril daily. Administer 2 sprays in each nostril for each dose., Disp: , Rfl:   •  hydrOXYzine (VISTARIL) 50 MG capsule, Take 50 mg by mouth 2 (two) times a day., Disp: , Rfl:   •  isosorbide mononitrate (IMDUR) 30 MG 24 hr tablet, Take 1 tablet by mouth Daily., Disp: 30 tablet, Rfl: 11  •  loratadine (CLARITIN) 10 MG tablet, Take 10 mg by mouth Daily., Disp: , Rfl:   •  metoprolol succinate XL (TOPROL-XL) 50 MG 24 hr tablet, Take 1 tablet by mouth Daily., Disp: 60 tablet, Rfl: 11  •  montelukast (SINGULAIR) 10 MG tablet, Take 10 mg by mouth Every Night., Disp: , Rfl:   •  nitroglycerin (NITROSTAT) 0.4 MG SL tablet, Place 1 tablet under the tongue Every 5 (Five) Minutes As Needed for chest pain. Take no more than 3 doses in 15 minutes., Disp: 30 tablet, Rfl: 12    The following portions of the patient's history were reviewed and updated as appropriate: allergies, current medications, past family history, past medical history, past social history, past surgical history and problem list.    ROS  Review of Systems   Constitution: Negative for chills, fever,  "fatigue, generalized weakness.   Cardiovascular: Negative for chest pain, dyspnea on exertion, leg swelling, palpitations, orthopnea, and syncope.   Respiratory: Negative for cough, shortness of breath, and wheezing.  HENT: Negative for ear pain, nosebleeds, and tinnitus.  Gastrointestinal: Negative for abdominal pain, constipation, diarrhea, nausea and vomiting.   Genitourinary: No urinary symptoms.  Musculoskeletal: Negative for muscle cramps.  Neurological: Negative for dizziness, headaches, loss of balance, numbness, and symptoms of stroke.  Psychiatric: Normal mental status.     All other systems reviewed and are negative.        Objective:     /68 (BP Location: Left arm, Patient Position: Sitting)   Pulse 56   Ht 162.6 cm (64\")   Wt 82.1 kg (181 lb)   BMI 31.07 kg/m²      Physical Exam  Constitutional: Patient appears well-developed and well-nourished.   HENT: HEENT exam unremarkable.   Neck: Neck supple. No JVD present. No carotid bruits.   Cardiovascular: Normal rate, regular rhythm and normal heart sounds. No murmur heard.   2+ symmetric pulses.   Pulmonary/Chest: Breath sounds normal. Does not exhibit tenderness.   Abdominal: Abdomen benign.   Musculoskeletal: Does not exhibit edema.   Neurological: Neurological exam unremarkable.   Vitals reviewed.    Data Review:      Procedures       Assessment:      Diagnosis Plan   1. Coronary artery disease involving native coronary artery of native heart without angina pectoris  Stable, continue current medications.   2. Essential hypertension  Well-controlled, continue current medications   3. Hyperlipidemia LDL goal <70  Monitored by PCP.  Will obtain recent labs from PCP. Continue Praluent.     Plan:   Stbale cardiac status overall.  Continue current medications.   FU in 12 MO, sooner as needed.  Thank you for allowing us to participate in the care of your patient.     Scribed for Tony Johnson MD by Tabitha Ho. 7/28/2020  11:59     I, Tony Johnson, " MD, personally performed the services described in this documentation as scribed by the above named individual in my presence, and it is both accurate and complete.  7/28/2020  12:29        Please note that portions of this note may have been completed with a voice recognition program. Efforts were made to edit the dictations, but occasionally words are mistranscribed.

## 2020-07-30 ENCOUNTER — TELEPHONE (OUTPATIENT)
Dept: CARDIOLOGY | Facility: CLINIC | Age: 80
End: 2020-07-30

## 2020-07-30 NOTE — TELEPHONE ENCOUNTER
----- Message from Tony Johnson MD sent at 7/28/2020 10:54 AM EDT -----  Please refill all cardiac meds, including Praluent. Please obtain recent FLP and CMP from PCP. Thanks

## 2020-11-12 ENCOUNTER — TRANSCRIBE ORDERS (OUTPATIENT)
Dept: ADMINISTRATIVE | Facility: HOSPITAL | Age: 80
End: 2020-11-12

## 2020-11-12 DIAGNOSIS — Z01.818 PRE-OPERATIVE CLEARANCE: Primary | ICD-10-CM

## 2020-11-13 ENCOUNTER — LAB (OUTPATIENT)
Dept: LAB | Facility: HOSPITAL | Age: 80
End: 2020-11-13

## 2020-11-13 DIAGNOSIS — Z01.818 PRE-OPERATIVE CLEARANCE: ICD-10-CM

## 2020-11-13 PROCEDURE — C9803 HOPD COVID-19 SPEC COLLECT: HCPCS

## 2020-11-13 PROCEDURE — U0004 COV-19 TEST NON-CDC HGH THRU: HCPCS | Performed by: OPHTHALMOLOGY

## 2020-11-14 LAB — SARS-COV-2 RNA RESP QL NAA+PROBE: NOT DETECTED

## 2020-12-29 ENCOUNTER — TRANSCRIBE ORDERS (OUTPATIENT)
Dept: ADMINISTRATIVE | Facility: HOSPITAL | Age: 80
End: 2020-12-29

## 2020-12-29 DIAGNOSIS — Z01.818 PRE-OPERATIVE CLEARANCE: Primary | ICD-10-CM

## 2021-01-02 ENCOUNTER — LAB (OUTPATIENT)
Dept: LAB | Facility: HOSPITAL | Age: 81
End: 2021-01-02

## 2021-01-02 DIAGNOSIS — Z01.818 PRE-OPERATIVE CLEARANCE: ICD-10-CM

## 2021-01-02 PROCEDURE — C9803 HOPD COVID-19 SPEC COLLECT: HCPCS

## 2021-01-02 PROCEDURE — U0004 COV-19 TEST NON-CDC HGH THRU: HCPCS

## 2021-01-03 LAB — SARS-COV-2 RNA RESP QL NAA+PROBE: NOT DETECTED

## 2021-06-02 RX ORDER — ALIROCUMAB 150 MG/ML
150 INJECTION, SOLUTION SUBCUTANEOUS
Qty: 2 PEN | Refills: 11 | Status: SHIPPED | OUTPATIENT
Start: 2021-06-02 | End: 2022-03-07 | Stop reason: SDUPTHER

## 2021-08-02 NOTE — PROGRESS NOTES
[facility]    Encounter Date: 2021    Patient ID: Chino Bailey is a 80 y.o. male.  : 1940     PCP: Alma Chávez DO       Chief Complaint: Coronary Artery Disease      PROBLEM LIST:  1.  Coronary artery disease:  a. Anterolateral ST segment elevation  MI in 2004.  b. [facility], 2004: EF 50% with moderate inferior hypokinesis, 80% mid LAD stenosis with 95% D1 stenosis, 70% OM and 70% mid to distal circumflex.   c. [facility], 2004, Dr. Mireles: D1 s/p 2.5 x 13 mm Cypher stent. LAD s/p 3.0 x 18 mm Cypher.   d. [facility], 10/05/2004, Dr. Johnson: Distal LCx s/p 2.5 x 24 Taxus stent. OM1 s/p 2.5 x 20 mm Taxus stent.  e. Cardiolite GXT, 2006 was negative for ischemia. EF 64%.  f. [facility], 2007, Dr. Johnson: 70% focal mid-segment LAD stenosis s/p 3.0 x 8 Taxus stent. 99% stenosis of the small proximal right PDA s/p balloon angioplasty with excellent results. All previous stents were patent. EF normal.  g. Cardiolite stress test on 2011: Negative for ischemia and showed EF of 69%.    h. Angina with hospital presentation to [facility]; MI excluded.  i. Cardiolite stress test on 2013:  Negative for ischemia and  showed normal LV function.    j. [facility], 12/15/16: Severe one vessel CAD. 95% stenosis of first OM branch of LCx. Moderate disease of LAD and RCA., LCx. BETH to first OM (Xience 2.5 x 12 mm)  2. HTN.  3. Mixed hypercholesterolemia with intolerance to statin, subhash jaundice, due to elevated LFTs.  4. Osteoarthritis.  5. Seasonal allergies.  6. GERD.  7. Status post cholecystectomy.  8. Proteinuria.  9. Chronic kidney disease, stage 3.    History of Present Illness  Patient presents today for an annual follow-up with a history of coronary artery disease and cardiac risk factors. Since last visit, he has done well from cardiac standpoint without any chest pain or shortness of breath.  States that his blood pressure has been higher than  normal running in 180's. He reports lower extremity weakness. He had Coronavirus last month and was given steroids and some other medications. He had received his immunizations prior to dyllan Coronavirus.  Patient denies chest pain, shortness of breath, palpitations, edema, dizziness, and syncope.       Allergies   Allergen Reactions   • Statins Other (See Comments)     Skin turned yellow per p,t jaundice         Current Outpatient Medications:   •  Alirocumab (Praluent) 150 MG/ML injection pen, Inject 1 mL under the skin into the appropriate area as directed Every 14 (Fourteen) Days. NEED FOLLOW UP APT FOR FURTHER REFILLS, Disp: 2 pen, Rfl: 11  •  aspirin 325 MG tablet, Take 1 tablet by mouth Daily., Disp: 90 tablet, Rfl: 3  •  chlorthalidone (HYGROTON) 25 MG tablet, Take 25 mg by mouth Daily., Disp: , Rfl:   •  esomeprazole (NexIUM) 40 MG capsule, Take 40 mg by mouth every morning before breakfast., Disp: , Rfl:   •  febuxostat (ULORIC) 80 MG tablet tablet, Take 80 mg by mouth Daily., Disp: , Rfl:   •  fluticasone (FLONASE) 50 MCG/ACT nasal spray, 2 sprays into each nostril daily. Administer 2 sprays in each nostril for each dose., Disp: , Rfl:   •  hydrOXYzine (VISTARIL) 50 MG capsule, Take 50 mg by mouth 2 (two) times a day., Disp: , Rfl:   •  isosorbide mononitrate (IMDUR) 30 MG 24 hr tablet, Take 1 tablet by mouth Daily., Disp: 90 tablet, Rfl: 3  •  loratadine (CLARITIN) 10 MG tablet, Take 10 mg by mouth Daily., Disp: , Rfl:   •  metoprolol succinate XL (TOPROL-XL) 50 MG 24 hr tablet, Take 1 tablet by mouth Daily., Disp: 90 tablet, Rfl: 3  •  nitroglycerin (NITROSTAT) 0.4 MG SL tablet, Place 1 tablet under the tongue Every 5 (Five) Minutes As Needed for Chest Pain. Take no more than 3 doses in 15 minutes., Disp: 30 tablet, Rfl: 12  •  losartan (COZAAR) 50 MG tablet, Take 1 tablet by mouth Daily., Disp: 90 tablet, Rfl: 3    The following portions of the patient's history were reviewed and updated as  "appropriate: allergies, current medications, past family history, past medical history, past social history, past surgical history and problem list.    ROS  Review of Systems   Constitution: Negative for chills, fever, fatigue, generalized weakness.   Cardiovascular: Negative for chest pain, dyspnea on exertion, leg swelling, palpitations, orthopnea, and syncope.   Respiratory: Negative for cough, shortness of breath, and wheezing.  HENT: Negative for ear pain, nosebleeds, and tinnitus.  Gastrointestinal: Negative for abdominal pain, constipation, diarrhea, nausea and vomiting.   Genitourinary: No urinary symptoms.  Musculoskeletal: Negative for muscle cramps.  Neurological: Negative for dizziness, headaches, loss of balance, numbness, and symptoms of stroke.  Psychiatric: Normal mental status.     All other systems reviewed and are negative.        Objective:     BP (!) 184/83 (BP Location: Left arm, Patient Position: Sitting)   Pulse 68   Ht 162.6 cm (64\")   Wt 84.4 kg (186 lb)   SpO2 97%   BMI 31.93 kg/m²    BP repeat: 166/84    Physical Exam  Constitutional: Patient appears well-developed and well-nourished.   HENT: HEENT exam unremarkable.   Neck: Neck supple. No JVD present. No carotid bruits.   Cardiovascular: Normal rate, regular rhythm and normal heart sounds. No murmur heard.   2+ symmetric pulses.   Pulmonary/Chest: Breath sounds normal. Does not exhibit tenderness.   Abdominal: Abdomen benign.   Musculoskeletal: Does not exhibit edema.   Neurological: Neurological exam unremarkable.   Vitals reviewed.    Data Review:     Lab date: 7/17/2020  • FLP: , , HDL 37, LDL 44  • CMP: Glu 108, BUN 24, Creat 1.30, Na 140, K 4.0, Cl 99, CO2 30, Ca 9.5, Alk Phos 63, AST 31, ALT 21       Procedures       Assessment:      Diagnosis Plan   1. Coronary artery disease involving native coronary artery of native heart without angina. Stable without angina; continue aspirin 325 mg for antiplatelet therapy, " Imdur 30 mg daily, and nitroglycerin as needed for chest pain.   2. Essential hypertension  Unacceptable control; begin losartan 50 mg daily and continue all other antihypertensives.    3. Hyperlipidemia LDL goal <70       Plan:   Begin losartan 50 mg daily for improved control of blood pressure.   BMP in 2 weeks to recheck creatinine level.   Continue all other current medications.   Monitor blood pressure at home, goal of less than 130-140 discussed, report back if it remains elevated and follow-up with PCP for close monitoring.  FU with me in 6 MO, sooner as needed.  Thank you for allowing us to participate in the care of your patient.     Scribed for Tony Johnson MD by Tiara Lemus. 8/3/2021 11:21 EDT      I, Tony Johnson MD, personally performed the services described in this documentation as scribed by the above named individual in my presence, and it is both accurate and complete.  8/4/2021  07:34 EDT        Please note that portions of this note may have been completed with a voice recognition program. Efforts were made to edit the dictations, but occasionally words are mistranscribed.

## 2021-08-03 ENCOUNTER — OFFICE VISIT (OUTPATIENT)
Dept: CARDIOLOGY | Facility: CLINIC | Age: 81
End: 2021-08-03

## 2021-08-03 VITALS
SYSTOLIC BLOOD PRESSURE: 184 MMHG | HEIGHT: 64 IN | DIASTOLIC BLOOD PRESSURE: 83 MMHG | OXYGEN SATURATION: 97 % | HEART RATE: 68 BPM | WEIGHT: 186 LBS | BODY MASS INDEX: 31.76 KG/M2

## 2021-08-03 DIAGNOSIS — I10 ESSENTIAL HYPERTENSION: ICD-10-CM

## 2021-08-03 DIAGNOSIS — E78.5 HYPERLIPIDEMIA LDL GOAL <70: ICD-10-CM

## 2021-08-03 DIAGNOSIS — I25.10 CORONARY ARTERY DISEASE INVOLVING NATIVE CORONARY ARTERY OF NATIVE HEART WITHOUT ANGINA PECTORIS: Primary | ICD-10-CM

## 2021-08-03 PROCEDURE — 99214 OFFICE O/P EST MOD 30 MIN: CPT | Performed by: INTERNAL MEDICINE

## 2021-08-03 RX ORDER — FEBUXOSTAT 80 MG/1
80 TABLET, FILM COATED ORAL DAILY
COMMUNITY

## 2021-08-03 RX ORDER — LOSARTAN POTASSIUM 50 MG/1
50 TABLET ORAL DAILY
Qty: 90 TABLET | Refills: 3 | Status: SHIPPED | OUTPATIENT
Start: 2021-08-03 | End: 2022-03-07 | Stop reason: SDUPTHER

## 2021-08-04 PROBLEM — I25.810 CORONARY ARTERY DISEASE INVOLVING CORONARY BYPASS GRAFT OF NATIVE HEART WITHOUT ANGINA PECTORIS: Status: ACTIVE | Noted: 2021-08-04

## 2021-08-24 ENCOUNTER — TELEPHONE (OUTPATIENT)
Dept: CARDIOLOGY | Facility: CLINIC | Age: 81
End: 2021-08-24

## 2021-08-24 NOTE — TELEPHONE ENCOUNTER
----- Message from Tony Johnson MD sent at 8/3/2021  9:29 AM EDT -----  We gave him an order for BMP in 2 weeks, please make sure it is done and we get the results

## 2022-03-07 DIAGNOSIS — I25.110 CORONARY ARTERY DISEASE INVOLVING NATIVE CORONARY ARTERY OF NATIVE HEART WITH UNSTABLE ANGINA PECTORIS: ICD-10-CM

## 2022-03-07 RX ORDER — ALIROCUMAB 150 MG/ML
150 INJECTION, SOLUTION SUBCUTANEOUS
Qty: 2 PEN | Refills: 0 | Status: SHIPPED | OUTPATIENT
Start: 2022-03-07 | End: 2022-11-02 | Stop reason: SDUPTHER

## 2022-03-07 RX ORDER — ISOSORBIDE MONONITRATE 30 MG/1
30 TABLET, EXTENDED RELEASE ORAL DAILY
Qty: 90 TABLET | Refills: 0 | Status: SHIPPED | OUTPATIENT
Start: 2022-03-07 | End: 2022-06-10

## 2022-03-07 RX ORDER — ASPIRIN 325 MG
325 TABLET ORAL DAILY
Qty: 90 TABLET | Refills: 0 | Status: SHIPPED | OUTPATIENT
Start: 2022-03-07

## 2022-03-07 RX ORDER — NITROGLYCERIN 0.4 MG/1
0.4 TABLET SUBLINGUAL
Qty: 30 TABLET | Refills: 12 | Status: SHIPPED | OUTPATIENT
Start: 2022-03-07 | End: 2022-11-02 | Stop reason: SDUPTHER

## 2022-03-07 RX ORDER — METOPROLOL SUCCINATE 50 MG/1
50 TABLET, EXTENDED RELEASE ORAL DAILY
Qty: 90 TABLET | Refills: 0 | Status: SHIPPED | OUTPATIENT
Start: 2022-03-07 | End: 2022-05-05

## 2022-03-07 RX ORDER — LOSARTAN POTASSIUM 50 MG/1
50 TABLET ORAL DAILY
Qty: 90 TABLET | Refills: 0 | Status: SHIPPED | OUTPATIENT
Start: 2022-03-07 | End: 2022-10-26

## 2022-05-05 RX ORDER — METOPROLOL SUCCINATE 50 MG/1
TABLET, EXTENDED RELEASE ORAL
Qty: 90 TABLET | Refills: 0 | Status: SHIPPED | OUTPATIENT
Start: 2022-05-05 | End: 2022-11-02 | Stop reason: SDUPTHER

## 2022-06-10 RX ORDER — ISOSORBIDE MONONITRATE 30 MG/1
TABLET, EXTENDED RELEASE ORAL
Qty: 90 TABLET | Refills: 0 | Status: SHIPPED | OUTPATIENT
Start: 2022-06-10 | End: 2022-09-07

## 2022-09-07 RX ORDER — ISOSORBIDE MONONITRATE 30 MG/1
TABLET, EXTENDED RELEASE ORAL
Qty: 30 TABLET | Refills: 0 | Status: SHIPPED | OUTPATIENT
Start: 2022-09-07 | End: 2022-10-10

## 2022-10-10 RX ORDER — ISOSORBIDE MONONITRATE 30 MG/1
TABLET, EXTENDED RELEASE ORAL
Qty: 30 TABLET | Refills: 0 | Status: SHIPPED | OUTPATIENT
Start: 2022-10-10 | End: 2022-11-02 | Stop reason: SDUPTHER

## 2022-10-26 RX ORDER — LOSARTAN POTASSIUM 50 MG/1
50 TABLET ORAL DAILY
Qty: 90 TABLET | Refills: 0 | Status: SHIPPED | OUTPATIENT
Start: 2022-10-26 | End: 2022-11-02 | Stop reason: SDUPTHER

## 2022-11-02 ENCOUNTER — OFFICE VISIT (OUTPATIENT)
Dept: CARDIOLOGY | Facility: CLINIC | Age: 82
End: 2022-11-02

## 2022-11-02 VITALS
WEIGHT: 187 LBS | BODY MASS INDEX: 31.92 KG/M2 | OXYGEN SATURATION: 97 % | HEIGHT: 64 IN | DIASTOLIC BLOOD PRESSURE: 80 MMHG | SYSTOLIC BLOOD PRESSURE: 132 MMHG | HEART RATE: 61 BPM

## 2022-11-02 DIAGNOSIS — I25.10 CORONARY ARTERY DISEASE INVOLVING NATIVE CORONARY ARTERY OF NATIVE HEART WITHOUT ANGINA PECTORIS: Primary | ICD-10-CM

## 2022-11-02 DIAGNOSIS — I25.110 CORONARY ARTERY DISEASE INVOLVING NATIVE CORONARY ARTERY OF NATIVE HEART WITH UNSTABLE ANGINA PECTORIS: ICD-10-CM

## 2022-11-02 DIAGNOSIS — I10 ESSENTIAL HYPERTENSION: ICD-10-CM

## 2022-11-02 DIAGNOSIS — E78.5 HYPERLIPIDEMIA LDL GOAL <70: ICD-10-CM

## 2022-11-02 PROCEDURE — 93000 ELECTROCARDIOGRAM COMPLETE: CPT | Performed by: INTERNAL MEDICINE

## 2022-11-02 PROCEDURE — 99214 OFFICE O/P EST MOD 30 MIN: CPT | Performed by: INTERNAL MEDICINE

## 2022-11-02 RX ORDER — METOPROLOL SUCCINATE 50 MG/1
50 TABLET, EXTENDED RELEASE ORAL DAILY
Qty: 90 TABLET | Refills: 3 | Status: SHIPPED | OUTPATIENT
Start: 2022-11-02

## 2022-11-02 RX ORDER — TAMSULOSIN HYDROCHLORIDE 0.4 MG/1
1 CAPSULE ORAL DAILY
COMMUNITY
Start: 2022-10-07

## 2022-11-02 RX ORDER — AMMONIUM LACTATE 12 G/100G
1 CREAM TOPICAL DAILY
COMMUNITY
Start: 2022-09-28

## 2022-11-02 RX ORDER — LOSARTAN POTASSIUM 50 MG/1
50 TABLET ORAL DAILY
Qty: 90 TABLET | Refills: 3 | Status: SHIPPED | OUTPATIENT
Start: 2022-11-02

## 2022-11-02 RX ORDER — COLCHICINE 0.6 MG/1
1 TABLET ORAL AS NEEDED
COMMUNITY
Start: 2022-10-10

## 2022-11-02 RX ORDER — ISOSORBIDE MONONITRATE 30 MG/1
30 TABLET, EXTENDED RELEASE ORAL DAILY
Qty: 90 TABLET | Refills: 3 | Status: SHIPPED | OUTPATIENT
Start: 2022-11-02

## 2022-11-02 RX ORDER — CHLORTHALIDONE 25 MG/1
25 TABLET ORAL DAILY
Qty: 90 TABLET | Refills: 3 | Status: SHIPPED | OUTPATIENT
Start: 2022-11-02

## 2022-11-02 RX ORDER — ALIROCUMAB 150 MG/ML
150 INJECTION, SOLUTION SUBCUTANEOUS
Qty: 6 ML | Refills: 3 | Status: SHIPPED | OUTPATIENT
Start: 2022-11-02

## 2022-11-02 RX ORDER — NITROGLYCERIN 0.4 MG/1
0.4 TABLET SUBLINGUAL
Qty: 100 TABLET | Refills: 3 | Status: SHIPPED | OUTPATIENT
Start: 2022-11-02

## 2022-11-02 NOTE — PROGRESS NOTES
[facility] Cardiology    Encounter Date: 2022    Patient ID: Chino Bailey is a 81 y.o. male.  : 1940     PCP: Alma Chávez DO       Chief Complaint: Coronary artery disease involving native coronary artery of      PROBLEM LIST:  1.  Coronary artery disease:  a. Anterolateral ST segment elevation  MI in 2004.  b. [facility], 2004: EF 50% with moderate inferior hypokinesis, 80% mid LAD stenosis with 95% D1 stenosis, 70% OM and 70% mid to distal circumflex.   c. [facility], 2004, Dr. Mireles: D1 s/p 2.5 x 13 mm Cypher stent. LAD s/p 3.0 x 18 mm Cypher.   d. [facility], 10/05/2004, Dr. Johnson: Distal LCx s/p 2.5 x 24 Taxus stent. OM1 s/p 2.5 x 20 mm Taxus stent.  e. [facility], 2006 was negative for ischemia. EF 64%.  f. [facility], 2007, Dr. Johnson: 70% focal mid-segment LAD stenosis s/p 3.0 x 8 Taxus stent. 99% stenosis of the small proximal right PDA s/p balloon angioplasty with excellent results. All previous stents were patent. EF normal.  g. Cardiolite stress test on 2011: Negative for ischemia and showed EF of 69%.    h. Angina with hospital presentation to [facility]; MI excluded.  i. Cardiolite stress test on 2013:  Negative for ischemia and  showed normal LV function.    j. [facility], 12/15/16: Severe one vessel CAD. 95% stenosis of first OM branch of LCx. Moderate disease of LAD and RCA., LCx. BETH to first OM (Xience 2.5 x 12 mm)  2. HTN.  3. Mixed hypercholesterolemia with intolerance to statin, subhash jaundice, due to elevated LFTs.  4. Osteoarthritis.  5. Seasonal allergies.  6. GERD.  7. Status post cholecystectomy.  8. Proteinuria.  9. Chronic kidney disease, stage 3.    History of Present Illness  Patient presents today for a follow-up with a history of coronary artery disease and cardiac risk factors. Since last visit, the patient has been doing well overall from a cardiovascular standpoint. He experiences shortness of breath with  activity but thinks this is due to obesity. He admits he needs to improve his diet. PCP monitors labs and rechecks them every 3 months. Patient denies chest pain, palpitations, edema, dizziness, and syncope.     Allergies   Allergen Reactions   • Statins Other (See Comments)     Skin turned yellow per p,t jaundice         Current Outpatient Medications:   •  Alirocumab (Praluent) 150 MG/ML injection pen, Inject 1 mL under the skin into the appropriate area as directed Every 14 (Fourteen) Days. NEED FOLLOW UP APT FOR FURTHER REFILLS, Disp: 2 pen, Rfl: 0  •  ammonium lactate (AMLACTIN) 12 % cream, Apply 1 application topically to the appropriate area as directed Daily., Disp: , Rfl:   •  aspirin 325 MG tablet, Take 1 tablet by mouth Daily., Disp: 90 tablet, Rfl: 0  •  colchicine 0.6 MG tablet, Take 1 tablet by mouth As Needed., Disp: , Rfl:   •  Diclofenac Sodium (VOLTAREN) 1 % gel gel, Apply 1 g topically to the appropriate area as directed As Needed., Disp: , Rfl:   •  esomeprazole (NexIUM) 40 MG capsule, Take 40 mg by mouth every morning before breakfast., Disp: , Rfl:   •  febuxostat (ULORIC) 80 MG tablet tablet, Take 80 mg by mouth Daily., Disp: , Rfl:   •  fluticasone (FLONASE) 50 MCG/ACT nasal spray, 2 sprays into each nostril daily. Administer 2 sprays in each nostril for each dose., Disp: , Rfl:   •  hydrOXYzine (VISTARIL) 50 MG capsule, Take 1 capsule by mouth Every Night., Disp: , Rfl:   •  isosorbide mononitrate (IMDUR) 30 MG 24 hr tablet, TAKE ONE TABLET BY MOUTH ONCE DAILY FOR HEART, Disp: 30 tablet, Rfl: 0  •  loratadine (CLARITIN) 10 MG tablet, Take 10 mg by mouth Daily., Disp: , Rfl:   •  losartan (COZAAR) 50 MG tablet, Take 1 tablet by mouth Daily. NEEDS APPT FOR FUTURE REFILLS, Disp: 90 tablet, Rfl: 0  •  metoprolol succinate XL (TOPROL-XL) 50 MG 24 hr tablet, TAKE 1 TABLET BY MOUTH ONCE A DAY FOR BLOOD PRESSURE OR HEART, Disp: 90 tablet, Rfl: 0  •  nitroglycerin (NITROSTAT) 0.4 MG SL tablet, Place  "1 tablet under the tongue Every 5 (Five) Minutes As Needed for Chest Pain. Take no more than 3 doses in 15 minutes., Disp: 30 tablet, Rfl: 12  •  tamsulosin (FLOMAX) 0.4 MG capsule 24 hr capsule, Take 1 capsule by mouth Daily., Disp: , Rfl:   •  chlorthalidone (HYGROTON) 25 MG tablet, Take 25 mg by mouth Daily., Disp: , Rfl:     The following portions of the patient's history were reviewed and updated as appropriate: allergies, current medications, past family history, past medical history, past social history, past surgical history and problem list.    ROS  Review of Systems   Constitution: Negative for chills, fever, fatigue, generalized weakness.   Cardiovascular: Negative for chest pain, dyspnea on exertion, leg swelling, palpitations, orthopnea, and syncope.   Respiratory: Negative for cough, and wheezing. Positive for shortness of breath  HENT: Negative for ear pain, nosebleeds, and tinnitus.  Gastrointestinal: Negative for abdominal pain, constipation, diarrhea, nausea and vomiting.   Genitourinary: No urinary symptoms.  Musculoskeletal: Negative for muscle cramps.  Neurological: Negative for dizziness, headaches, loss of balance, numbness, and symptoms of stroke.  Psychiatric: Normal mental status.     All other systems reviewed and are negative.        Objective:     /80 (BP Location: Right arm, Patient Position: Sitting, Cuff Size: Adult)   Pulse 61   Ht 161.3 cm (63.5\")   Wt 84.8 kg (187 lb)   SpO2 97%   BMI 32.61 kg/m²      Physical Exam  Constitutional: Patient appears well-developed and well-nourished.   HENT: HEENT exam unremarkable.   Neck: Neck supple. No JVD present. No carotid bruits.   Cardiovascular: Normal rate, regular rhythm and normal heart sounds. No murmur heard.   2+ symmetric pulses.   Pulmonary/Chest: Breath sounds normal. Does not exhibit tenderness.   Abdominal: Abdomen benign.   Musculoskeletal: Does not exhibit edema.   Neurological: Neurological exam unremarkable. "   Vitals reviewed.    Data Review:     Lab date: 11/9/2021  • FLP: , , HDL 35, LDL 45  • CMP: Glu 98, BUN 17, Creat 1.36, Na 130, K 4.6, Cl 99, CO2 28, Ca 9.3, Alk Phos 74, AST 30, ALT 22         ECG 12 Lead    Date/Time: 11/2/2022 2:04 PM  Performed by: Tony Johnson MD  Authorized by: Tony Johnson MD   Comparison: compared with previous ECG from 1/4/2017  Similar to previous ECG  Rhythm: sinus rhythm  BPM: 61  Other findings comments: non-specific T wave abnormality    Clinical impression: abnormal EKG               Assessment:      Diagnosis Plan   1. Coronary artery disease involving native coronary artery of native heart without angina pectoris  Stable without anginal symptoms. Pt reports SOB but attributes this to obesity. Continue aspirin 325 mg and Imdur 30 mg daily and nitroglycerin as needed for angina       2. Essential hypertension  Well controlled; continue metoprolol 50 mg, losartan 50 mg, and chlorthalidone 25 mg       3. Hyperlipidemia LDL goal <70  Monitored by PCP; continue Praluent 150 mg/mL every 14 days         Plan:   Recommend intermittent fasting and avoid carbs to aid in weight loss and improve breathing.   Continue current medications.   FU in 12 MO, sooner as needed.  Thank you for allowing us to participate in the care of your patient.     Scribed for Tony Johnson MD by Tiara Lemus. 11/2/2022 13:45 EDT      ITony MD, personally performed the services described in this documentation as scribed by the above named individual in my presence, and it is both accurate and complete.  11/2/2022  16:12 EDT      Part of this note may be an electronic transcription/translation of spoken language to printed text using the Dragon Dictation System.

## 2023-01-24 ENCOUNTER — TELEPHONE (OUTPATIENT)
Dept: CARDIOLOGY | Facility: CLINIC | Age: 83
End: 2023-01-24
Payer: MEDICARE

## 2023-03-03 NOTE — TELEPHONE ENCOUNTER
PA needed again for praluent.     Key: [id number]    PA denied, appeal sent in.    Praluent approved through March 03, 2023.

## 2023-04-24 ENCOUNTER — TELEPHONE (OUTPATIENT)
Dept: CARDIOLOGY | Facility: CLINIC | Age: 83
End: 2023-04-24
Payer: MEDICARE

## 2023-04-24 NOTE — TELEPHONE ENCOUNTER
Patient's daughter called inquiring about medications. Patient has not been taking losartan and got a refill and was wondering if he still needed it. It seems that we have the patient on chlorthalidone 25mg, Imdur 30mg Losartan 50mg and Toprol 25mg.   Patient's blood pressure has been in the 130-140/70's.  Recommended patient to hold it for now since he hasn't taken it, plus he states he gets fatigue with too many blood pressure meds.   Patient to call back if he has any questions.

## 2023-11-03 ENCOUNTER — OFFICE VISIT (OUTPATIENT)
Dept: CARDIOLOGY | Facility: CLINIC | Age: 83
End: 2023-11-03
Payer: MEDICARE

## 2023-11-03 VITALS
HEIGHT: 64 IN | DIASTOLIC BLOOD PRESSURE: 80 MMHG | HEART RATE: 54 BPM | BODY MASS INDEX: 32.13 KG/M2 | WEIGHT: 188.2 LBS | SYSTOLIC BLOOD PRESSURE: 134 MMHG | OXYGEN SATURATION: 96 %

## 2023-11-03 DIAGNOSIS — I25.810 CORONARY ARTERY DISEASE INVOLVING CORONARY BYPASS GRAFT OF NATIVE HEART WITHOUT ANGINA PECTORIS: Primary | ICD-10-CM

## 2023-11-03 DIAGNOSIS — I10 ESSENTIAL HYPERTENSION: ICD-10-CM

## 2023-11-03 DIAGNOSIS — E78.5 HYPERLIPIDEMIA LDL GOAL <70: ICD-10-CM

## 2023-11-03 RX ORDER — CHLORTHALIDONE 25 MG/1
25 TABLET ORAL DAILY
Qty: 90 TABLET | Refills: 3 | Status: SHIPPED | OUTPATIENT
Start: 2023-11-03

## 2023-11-03 RX ORDER — METOPROLOL SUCCINATE 50 MG/1
50 TABLET, EXTENDED RELEASE ORAL DAILY
Qty: 90 TABLET | Refills: 3 | Status: SHIPPED | OUTPATIENT
Start: 2023-11-03

## 2023-11-03 RX ORDER — ALIROCUMAB 150 MG/ML
150 INJECTION, SOLUTION SUBCUTANEOUS
Qty: 6 ML | Refills: 3 | Status: SHIPPED | OUTPATIENT
Start: 2023-11-03

## 2023-11-03 RX ORDER — CELECOXIB 100 MG/1
100 CAPSULE ORAL 2 TIMES DAILY
COMMUNITY
Start: 2023-10-05

## 2023-11-03 RX ORDER — ISOSORBIDE MONONITRATE 30 MG/1
30 TABLET, EXTENDED RELEASE ORAL DAILY
Qty: 90 TABLET | Refills: 3 | Status: SHIPPED | OUTPATIENT
Start: 2023-11-03

## 2023-11-03 NOTE — PROGRESS NOTES
[facility] Cardiology    Encounter Date: 2023    Patient ID: Chino Bailey is a 82 y.o. male.  : 1940     PCP: Kaycee Corrigan APRN       Chief Complaint: Coronary Artery Disease, Shortness of Breath, and Leg Swelling      PROBLEM LIST:   Coronary artery disease:  Anterolateral ST segment elevation  MI in 2004.  [facility], 2004: EF 50% with moderate inferior hypokinesis, 80% mid LAD stenosis with 95% D1 stenosis, 70% OM and 70% mid to distal circumflex.   [facility], 2004, Dr. Mireles: D1 s/p 2.5 x 13 mm Cypher stent. LAD s/p 3.0 x 18 mm Cypher.   [facility], 10/05/2004, Dr. Johnson: Distal LCx s/p 2.5 x 24 Taxus stent. OM1 s/p 2.5 x 20 mm Taxus stent.  Cardiolite GXT, 2006 was negative for ischemia. EF 64%.  [facility], 2007, Dr. Johnson: 70% focal mid-segment LAD stenosis s/p 3.0 x 8 Taxus stent. 99% stenosis of the small proximal right PDA s/p balloon angioplasty with excellent results. All previous stents were patent. EF normal.  Cardiolite stress test on 2011: Negative for ischemia and showed EF of 69%.    Angina with hospital presentation to [facility]; MI excluded.  Cardiolite stress test on 2013:  Negative for ischemia and  showed normal LV function.    [facility], 12/15/16: Severe one vessel CAD. 95% stenosis of first OM branch of LCx. Moderate disease of LAD and RCA., LCx. BETH to first OM (Xience 2.5 x 12 mm)  HTN.  Mixed hypercholesterolemia with intolerance to statin, subhash jaundice, due to elevated LFTs.  Osteoarthritis.  Seasonal allergies.  GERD.  Status post cholecystectomy.  Proteinuria.  Chronic kidney disease, stage 3.    History of Present Illness  Patient presents today for a 1 year follow-up with a history of CAD and cardiac risk factors. Since last visit, he has done well from cardiac standpoint.  Denies current chest pain.  Sometimes he gets short of breath with moderate exertion.  Has been experiencing pain in his legs, states that they  hurt all the time and not necessarily with walking.  He also has back pain.  Has mild edema which improves with leg elevation.  Denying orthopnea or PND.  No palpitations or syncope.  His labs are monitored by PCP.    Allergies   Allergen Reactions    Statins Other (See Comments)     Skin turned yellow per p,t jaundice         Current Outpatient Medications:     Alirocumab (Praluent) 150 MG/ML injection pen, Inject 1 mL under the skin into the appropriate area as directed Every 14 (Fourteen) Days., Disp: 6 mL, Rfl: 3    ammonium lactate (AMLACTIN) 12 % cream, Apply 1 application  topically to the appropriate area as directed Daily., Disp: , Rfl:     aspirin 325 MG tablet, Take 1 tablet by mouth Daily., Disp: 90 tablet, Rfl: 0    celecoxib (CeleBREX) 100 MG capsule, Take 1 capsule by mouth 2 (Two) Times a Day., Disp: , Rfl:     chlorthalidone (HYGROTON) 25 MG tablet, Take 1 tablet by mouth Daily., Disp: 90 tablet, Rfl: 3    colchicine 0.6 MG tablet, Take 1 tablet by mouth As Needed., Disp: , Rfl:     Diclofenac Sodium (VOLTAREN) 1 % gel gel, Apply 1 g topically to the appropriate area as directed As Needed., Disp: , Rfl:     esomeprazole (NexIUM) 40 MG capsule, Take 1 capsule by mouth Every Morning Before Breakfast., Disp: , Rfl:     febuxostat (ULORIC) 80 MG tablet tablet, Take 1 tablet by mouth Daily., Disp: , Rfl:     fluticasone (FLONASE) 50 MCG/ACT nasal spray, 2 sprays into the nostril(s) as directed by provider Daily. Administer 2 sprays in each nostril for each dose., Disp: , Rfl:     hydrOXYzine (VISTARIL) 50 MG capsule, Take 1 capsule by mouth Every Night., Disp: , Rfl:     isosorbide mononitrate (IMDUR) 30 MG 24 hr tablet, Take 1 tablet by mouth Daily. For heart, Disp: 90 tablet, Rfl: 3    metoprolol succinate XL (TOPROL-XL) 50 MG 24 hr tablet, Take 1 tablet by mouth Daily., Disp: 90 tablet, Rfl: 3    nitroglycerin (NITROSTAT) 0.4 MG SL tablet, Place 1 tablet under the tongue Every 5 (Five) Minutes As  "Needed for Chest Pain. Take no more than 3 doses in 15 minutes., Disp: 100 tablet, Rfl: 3    The following portions of the patient's history were reviewed and updated as appropriate: allergies, current medications, past family history, past medical history, past social history, past surgical history and problem list.    ROS  Review of Systems   14 point ROS negative except for that listed in the HPI.         Objective:     /80 (BP Location: Right arm, Patient Position: Sitting)   Pulse 54   Ht 162.6 cm (64\")   Wt 85.4 kg (188 lb 3.2 oz)   SpO2 96%   BMI 32.30 kg/m²      Physical Exam  Constitutional: Patient appears well-developed and well-nourished.   HENT: HEENT exam unremarkable.   Neck: Neck supple. No JVD present. No carotid bruits.   Cardiovascular: Normal rate, regular rhythm and normal heart sounds. No murmur heard.   2+ symmetric pulses.   Pulmonary/Chest: Breath sounds normal. Does not exhibit tenderness.   Abdominal: Abdomen benign.   Musculoskeletal: Does not exhibit edema.   Neurological: Neurological exam unremarkable.   Vitals reviewed.    Data Review:   Labs of October 14, 2023 reviewed.  Total cholesterol 123 triglycerides 205 HDL 34 LDL 48.  Creatinine 1.56 BUN 27 blood sugar 113 A1c 6.0 rest of the CMP is normal.  CBC is normal.         ECG 12 Lead    Date/Time: 11/3/2023 1:55 PM  Performed by: Tony Johnson MD    Authorized by: Tony Johnson MD  Comparison: compared with previous ECG from 12/2/2022  Rhythm: sinus bradycardia    Clinical impression: normal ECG         Assessment:      Diagnosis Plan   1. Coronary artery disease involving coronary bypass graft of native heart without angina pectoris  Stable, no current angina or CHF symptoms.  Continue current best tolerated GDMT.      2. Essential hypertension  Well-controlled, continue current antihypertensive therapy.      3. Hyperlipidemia LDL goal <70  Fair control with exception of elevated triglycerides dietary modification " recommended.  Continue Praluent.        Plan:   Stable cardiac status.  No angina or CHF symptoms.  Mild edema and exertional dyspnea appears consistent with deconditioning and venous insufficiency.  I have ordered echocardiogram for further assessment.  His pedal pulses are normal and symmetric bilaterally no evidence of arterial insufficiency.  Leg pain appears musculoskeletal and neuropathic from back pain.  Advised to follow-up with physical therapy and with PCP.  Continue current medications.   FU in 6 months at [facility]. sooner as needed.  Thank you for allowing us to participate in the care of your patient.       Tony Johnson MD, FACC, [facility]        Please note that portions of this note may have been completed with a voice recognition program. Efforts were made to edit the dictations, but occasionally words are mistranscribed.

## 2023-11-27 ENCOUNTER — DOCUMENTATION (OUTPATIENT)
Dept: CARDIOLOGY | Facility: CLINIC | Age: 83
End: 2023-11-27
Payer: MEDICARE

## 2023-12-01 DIAGNOSIS — I25.110 CORONARY ARTERY DISEASE INVOLVING NATIVE CORONARY ARTERY OF NATIVE HEART WITH UNSTABLE ANGINA PECTORIS: ICD-10-CM

## 2023-12-01 RX ORDER — NITROGLYCERIN 0.4 MG/1
TABLET SUBLINGUAL
Qty: 25 TABLET | Refills: 0 | Status: SHIPPED | OUTPATIENT
Start: 2023-12-01

## 2024-06-06 ENCOUNTER — TELEPHONE (OUTPATIENT)
Dept: CARDIOLOGY | Facility: CLINIC | Age: 84
End: 2024-06-06
Payer: MEDICARE

## 2024-06-11 ENCOUNTER — OFFICE VISIT (OUTPATIENT)
Dept: CARDIOLOGY | Facility: CLINIC | Age: 84
End: 2024-06-11
Payer: MEDICARE

## 2024-06-11 VITALS
SYSTOLIC BLOOD PRESSURE: 124 MMHG | DIASTOLIC BLOOD PRESSURE: 68 MMHG | HEART RATE: 58 BPM | WEIGHT: 193 LBS | OXYGEN SATURATION: 97 % | HEIGHT: 64 IN | BODY MASS INDEX: 32.95 KG/M2

## 2024-06-11 DIAGNOSIS — I10 ESSENTIAL HYPERTENSION: ICD-10-CM

## 2024-06-11 DIAGNOSIS — E78.5 DYSLIPIDEMIA: ICD-10-CM

## 2024-06-11 DIAGNOSIS — I25.810 CORONARY ARTERY DISEASE INVOLVING CORONARY BYPASS GRAFT OF NATIVE HEART WITHOUT ANGINA PECTORIS: Primary | ICD-10-CM

## 2024-06-11 PROCEDURE — 99214 OFFICE O/P EST MOD 30 MIN: CPT

## 2024-06-11 PROCEDURE — 3074F SYST BP LT 130 MM HG: CPT

## 2024-06-11 PROCEDURE — 1159F MED LIST DOCD IN RCRD: CPT

## 2024-06-11 PROCEDURE — 1160F RVW MEDS BY RX/DR IN RCRD: CPT

## 2024-06-11 PROCEDURE — G2211 COMPLEX E/M VISIT ADD ON: HCPCS

## 2024-06-11 PROCEDURE — 3078F DIAST BP <80 MM HG: CPT

## 2024-06-11 RX ORDER — CHLORTHALIDONE 25 MG/1
25 TABLET ORAL DAILY
Qty: 90 TABLET | Refills: 3 | Status: SHIPPED | OUTPATIENT
Start: 2024-06-11

## 2024-06-11 RX ORDER — TAMSULOSIN HYDROCHLORIDE 0.4 MG/1
1 CAPSULE ORAL
COMMUNITY
Start: 2024-04-26

## 2024-06-11 RX ORDER — ISOSORBIDE MONONITRATE 30 MG/1
30 TABLET, EXTENDED RELEASE ORAL DAILY
Qty: 90 TABLET | Refills: 3 | Status: SHIPPED | OUTPATIENT
Start: 2024-06-11

## 2024-06-11 RX ORDER — METOPROLOL SUCCINATE 50 MG/1
50 TABLET, EXTENDED RELEASE ORAL DAILY
Qty: 90 TABLET | Refills: 3 | Status: SHIPPED | OUTPATIENT
Start: 2024-06-11

## 2024-06-11 RX ORDER — ALIROCUMAB 150 MG/ML
150 INJECTION, SOLUTION SUBCUTANEOUS
Qty: 6 ML | Refills: 3 | Status: SHIPPED | OUTPATIENT
Start: 2024-06-11

## 2024-06-11 RX ORDER — ASPIRIN 325 MG
325 TABLET ORAL DAILY
Qty: 90 TABLET | Refills: 3 | Status: SHIPPED | OUTPATIENT
Start: 2024-06-11

## 2024-06-11 NOTE — PROGRESS NOTES
[facility] Cardiology    Encounter Date: 2024    Patient ID: Chino Bailey is a 83 y.o. male.  : 1940     PCP: Kaycee Corrigan APRN       Chief Complaint: Coronary Artery Disease (Coronary artery disease involving coronary bypass graft of native heart without angina pectoris)      PROBLEM LIST:  Coronary artery disease:  Anterolateral ST segment elevation  MI in 2004.  [facility], 2004: EF 50% with moderate inferior hypokinesis, 80% mid LAD stenosis with 95% D1 stenosis, 70% OM and 70% mid to distal circumflex.   [facility], 2004, Dr. Mireles: D1 s/p 2.5 x 13 mm Cypher stent. LAD s/p 3.0 x 18 mm Cypher.   [facility], 10/05/2004, Dr. Johnson: Distal LCx s/p 2.5 x 24 Taxus stent. OM1 s/p 2.5 x 20 mm Taxus stent.  Cardiolite GXT, 2006 was negative for ischemia. EF 64%.  [facility], 2007, Dr. Johnson: 70% focal mid-segment LAD stenosis s/p 3.0 x 8 Taxus stent. 99% stenosis of the small proximal right PDA s/p balloon angioplasty with excellent results. All previous stents were patent. EF normal.  Cardiolite stress test on 2011: Negative for ischemia and showed EF of 69%.    Angina with hospital presentation to [facility]; MI excluded.  Cardiolite stress test on 2013:  Negative for ischemia and  showed normal LV function.    [facility], 12/15/16: Severe one vessel CAD. 95% stenosis of first OM branch of LCx. Moderate disease of LAD and RCA., LCx. BETH to first OM (Xience 2.5 x 12 mm)  Echo, 2023: EF >55%. Mild AR/FL/TR. Trace to mild MR. AV sclerosis.   HTN.  Mixed hypercholesterolemia with intolerance to statin, subhash jaundice, due to elevated LFTs.  Osteoarthritis.  Seasonal allergies.  GERD.  Status post cholecystectomy.  Proteinuria.  Chronic kidney disease, stage 3.    History of Present Illness  Patient presents today for a follow-up with a history of CAD and cardiac risk factors. Since last visit, patient has been doing well from a cardiac standpoint. No ER  visits or hospitalizations. Staying active and busy in his daily life and has no cardiac complaints. 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 He does still have pain in his legs. States that this did get better when he went to PT but since stopping PT, his leg pain has returned. Patient denies any chest pain, progressive shortness of air, palpitations, orthopnea, edema, presyncope or syncope.      Allergies   Allergen Reactions    Statins Other (See Comments)     Skin turned yellow per p,t jaundice         Current Outpatient Medications:     Alirocumab (Praluent) 150 MG/ML injection pen, Inject 1 mL under the skin into the appropriate area as directed Every 14 (Fourteen) Days., Disp: 6 mL, Rfl: 3    ammonium lactate (AMLACTIN) 12 % cream, Apply 1 Application topically to the appropriate area as directed Daily., Disp: , Rfl:     aspirin 325 MG tablet, Take 1 tablet by mouth Daily., Disp: 90 tablet, Rfl: 3    celecoxib (CeleBREX) 100 MG capsule, Take 1 capsule by mouth 2 (Two) Times a Day., Disp: , Rfl:     chlorthalidone (HYGROTON) 25 MG tablet, Take 1 tablet by mouth Daily., Disp: 90 tablet, Rfl: 3    colchicine 0.6 MG tablet, Take 1 tablet by mouth As Needed., Disp: , Rfl:     Diclofenac Sodium (VOLTAREN) 1 % gel gel, Apply 1 g topically to the appropriate area as directed As Needed., Disp: , Rfl:     esomeprazole (NexIUM) 40 MG capsule, Take 1 capsule by mouth Every Morning Before Breakfast., Disp: , Rfl:     febuxostat (ULORIC) 80 MG tablet tablet, Take 1 tablet by mouth Daily., Disp: , Rfl:     fluticasone (FLONASE) 50 MCG/ACT nasal spray, 2 sprays into the nostril(s) as directed by provider Daily. Administer 2 sprays in each nostril for each dose., Disp: , Rfl:     hydrOXYzine (VISTARIL) 50 MG capsule, Take 1 capsule by mouth Every Night., Disp: , Rfl:     isosorbide mononitrate (IMDUR) 30 MG 24 hr tablet, Take 1 tablet by mouth Daily. For heart, Disp: 90  "tablet, Rfl: 3    metoprolol succinate XL (TOPROL-XL) 50 MG 24 hr tablet, Take 1 tablet by mouth Daily., Disp: 90 tablet, Rfl: 3    nitroglycerin (NITROSTAT) 0.4 MG SL tablet, PLACE 1 TABLET UNDER THE TONGUE EVERY 5 (FIVE) MINUTES AS NEEDED FOR CHEST PAIN. TAKE NO MORE, Disp: 25 tablet, Rfl: 0    tamsulosin (FLOMAX) 0.4 MG capsule 24 hr capsule, Take 1 capsule by mouth., Disp: , Rfl:     The following portions of the patient's history were reviewed and updated as appropriate: allergies, current medications, past family history, past medical history, past social history, past surgical history and problem list.        Objective:     /68 (BP Location: Left arm, Patient Position: Sitting)   Pulse 58   Ht 162.6 cm (64\")   Wt 87.5 kg (193 lb)   SpO2 97%   BMI 33.13 kg/m²      Physical Exam  Constitutional: Patient appears well-developed and well-nourished.   HENT: HEENT exam unremarkable.   Neck: Neck supple. No JVD present.   Cardiovascular: Normal rate, regular rhythm and normal heart sounds. No murmur heard.   2+ symmetric pulses.   Pulmonary/Chest: Breath sounds normal. Does not exhibit tenderness.   Musculoskeletal: Does not exhibit edema.   Neurological: Neurological exam unremarkable.   Vitals reviewed.    Data Review:   Lab date: 10/13/2023  FLP: , , HDL 34, LDL 48  CMP: Glu 113, BUN 27, Creat 1.56, eGFR 33, Na 143, K 4.2, Cl 102, CO2 32, Ca 9.7, Alk Phos 69, AST 26, ALT 25  CBC: WBC 7.2, RBC 5.27, HGB 15.3, HCT 45.7, MCV 86.7, MCH 29,   HbA1c: 6       Procedures       Advance Care Planning   ACP discussion was held with the patient during this visit. Patient does not have an advance directive, declines further assistance.           Assessment and plan:      Diagnosis Plan   1. Coronary artery disease involving coronary bypass graft of native heart without angina pectoris  Stable without current angina. Continue aspirin and PCSK9. He is intolerant to statins. Praluent has kept LDL at " target, which is < 55.       2. Essential hypertension  Well controlled. Continue current antihypertensive regimen.       3. Dyslipidemia  Continue PCSK9. He is intolerant to statins. Praluent has kept LDL at target, which is < 55. This is medically necessary due to history of significant CAD. Requesting recent lipid panel from PCP.         Continue current medications.   FU in 12 MO, sooner as needed.  Thank you for allowing us to participate in the care of your patient.       Mariah Cooley PA-C      Please note that portions of this note may have been completed with a voice recognition program. Efforts were made to edit the dictations, but occasionally words are mistranscribed.

## 2024-07-30 ENCOUNTER — SPECIALTY PHARMACY (OUTPATIENT)
Dept: CARDIOLOGY | Facility: CLINIC | Age: 84
End: 2024-07-30
Payer: MEDICARE

## 2024-08-01 NOTE — PROGRESS NOTES
Specialty Pharmacy Patient Management Program  Cardiology Initial Assessment     Chino Bailey was referred by a Cardiology provider to the Cardiology Patient Management program offered by [facility] for Hyperlipidemia on 08/01/24.  An initial outreach was conducted, including assessment of therapy appropriateness and specialty medication education for Repatha. The patient was introduced to services offered by [facility], including: regular assessments, refill coordination, mail order delivery options, prior authorization maintenance, and financial assistance programs as applicable. The patient was also provided with contact information for the pharmacy team.     Insurance Coverage & Financial Support  [employer]     Relevant Past Medical History and Comorbidities  Relevant medical history and concomitant health conditions were discussed with the patient. The patient's chart has been reviewed for relevant past medical history and comorbid conditions and updated as necessary.  Past Medical History:   Diagnosis Date    Chronic kidney disease, stage 3     COVID-19 07/20/2021    GERD (gastroesophageal reflux disease)     Mixed dyslipidemia     Mixed hypercholesterolemia with intolerance to statin, subhash jaundice, due to elevated LFTs.    Osteoarthritis     Seasonal allergies     ST elevation myocardial infarction (STEMI) of anterolateral wall 09/2004     Social History     Socioeconomic History    Marital status:    Tobacco Use    Smoking status: Former     Types: Cigarettes     Passive exposure: Past    Smokeless tobacco: Former     Types: Chew     Quit date: 12/15/1966   Vaping Use    Vaping status: Never Used   Substance and Sexual Activity    Alcohol use: No    Drug use: No    Sexual activity: Defer       Problem list reviewed by Ryann Peterson, PharmD on 8/1/2024 at  9:52 AM    Allergies  Known allergies and reactions were discussed with the patient. The  patient's chart has been reviewed for  allergy information and updated as necessary.   Allergies   Allergen Reactions    Statins Other (See Comments)     Skin turned yellow per p,t jaundice       Allergies reviewed by Ryann Peterson, PharmD on 8/1/2024 at  9:52 AM    Relevant Laboratory Values  Relevant laboratory values were discussed with the patient. The following specialty medication dose adjustment(s) are recommended: Continue PCSK9 for cholesterol lowering    Lab Results   Component Value Date    GLUCOSE 88 12/16/2016    CALCIUM 9.5 12/16/2016     12/16/2016    K 3.8 12/16/2016    CO2 26.0 12/16/2016     12/16/2016    BUN 18 12/16/2016    CREATININE 1.20 12/16/2016    EGFRIFNONA 59 (L) 12/16/2016    BCR 15.0 12/16/2016    ANIONGAP 6.0 12/16/2016     Lab Results   Component Value Date    CHOL 209 (H) 12/15/2016    TRIG 96 12/15/2016    HDL 51 12/15/2016     (H) 12/15/2016         Current Medication List  This medication list has been reviewed with the patient and evaluated for any interactions or necessary modifications/recommendations, and updated to include all prescription medications, OTC medications, and supplements the patient is currently taking.  This list reflects what is contained in the patient's profile, which has also been marked as reviewed to communicate to other providers it is the most up to date version of the patient's current medication therapy.     Current Outpatient Medications:     Evolocumab (REPATHA) solution auto-injector SureClick injection, Inject 1 mL under the skin into the appropriate area as directed Every 14 (Fourteen) Days., Disp: 2 mL, Rfl: 11    ammonium lactate (AMLACTIN) 12 % cream, Apply 1 Application topically to the appropriate area as directed Daily., Disp: , Rfl:     aspirin 325 MG tablet, Take 1 tablet by mouth Daily., Disp: 90 tablet, Rfl: 3    celecoxib (CeleBREX) 100 MG capsule, Take 1 capsule by mouth 2 (Two) Times a Day., Disp: , Rfl:      chlorthalidone (HYGROTON) 25 MG tablet, Take 1 tablet by mouth Daily., Disp: 90 tablet, Rfl: 3    colchicine 0.6 MG tablet, Take 1 tablet by mouth As Needed., Disp: , Rfl:     Diclofenac Sodium (VOLTAREN) 1 % gel gel, Apply 1 g topically to the appropriate area as directed As Needed., Disp: , Rfl:     esomeprazole (NexIUM) 40 MG capsule, Take 1 capsule by mouth Every Morning Before Breakfast., Disp: , Rfl:     febuxostat (ULORIC) 80 MG tablet tablet, Take 1 tablet by mouth Daily., Disp: , Rfl:     fluticasone (FLONASE) 50 MCG/ACT nasal spray, 2 sprays into the nostril(s) as directed by provider Daily. Administer 2 sprays in each nostril for each dose., Disp: , Rfl:     hydrOXYzine (VISTARIL) 50 MG capsule, Take 1 capsule by mouth Every Night., Disp: , Rfl:     isosorbide mononitrate (IMDUR) 30 MG 24 hr tablet, Take 1 tablet by mouth Daily. For heart, Disp: 90 tablet, Rfl: 3    metoprolol succinate XL (TOPROL-XL) 50 MG 24 hr tablet, Take 1 tablet by mouth Daily., Disp: 90 tablet, Rfl: 3    nitroglycerin (NITROSTAT) 0.4 MG SL tablet, PLACE 1 TABLET UNDER THE TONGUE EVERY 5 (FIVE) MINUTES AS NEEDED FOR CHEST PAIN. TAKE NO MORE, Disp: 25 tablet, Rfl: 0    tamsulosin (FLOMAX) 0.4 MG capsule 24 hr capsule, Take 1 capsule by mouth., Disp: , Rfl:     Medicines reviewed by Ryann Peterson, PharmD on 8/1/2024 at  9:52 AM    Drug Interactions  None  Recommended Medications Assessment  Aspirin: Currently Taking   Statin: Contraindicated  ACEi/ARB: Not Taking Currently    Initial Education Provided for Specialty Medication  The patient has been provided with the following education and any applicable administration techniques (i.e. self-injection) have been demonstrated for the therapies indicated. All questions and concerns have been addressed prior to the patient receiving the medication, and the patient has verbalized comprehension of the education and any materials provided. Additional patient education shall be provided  and documented upon request by the patient, provider, or payer.    REPATHA® (evolocumab)  Medication Expectations   Why am I taking this medication? You are taking Repatha to lower your “bad” cholesterol (LDL-C). This medication can be used in adults with high blood cholesterol including primary hyperlipidemia and familial hypercholesterolemia.    What should I expect while on this medication? You should expect to see your cholesterol improve over time. Specifically, you should see your LDL-C decrease.    How does the medication work? Repatha works by blocking a protein called PCSK9 that contributes to high levels of bad cholesterol. It helps increase your liver's ability to remove bad cholesterol from your blood.     How long will I be on this medication for? The amount of time you will be on this medication will be determined by your doctor based on your cholesterol and/or your risk of having a cardiac event. You will most likely be on this medication or another cholesterol medication throughout your lifetime. Do not abruptly stop this medication without talking to your doctor first.    How do I take this medication? Take as directed on your prescription label. Repatha is injected under the skin (subcutaneously) of your stomach, thigh, or upper arm. This medication is usually given one or twice a month.   What are some possible side effects? The most common side effects of Repatha include redness, itching, swelling, or pain/tenderness at the injection site, symptoms of the common cold, flu or flu-like symptoms or back pain.    What happens if I miss a dose? If you miss a dose, take it as soon as you remember if it is within 7 days from the usual day of administration then resume your original schedule. If it is beyond 7 days and you use the rocky-2-week dose, skip the missed dose and resume your normal dosing schedule.If it is beyond 7 days and you use the once-monthly dose, inject the dose and start a new schedule  based on that date.      Medication Safety   What are things I should warn my doctor immediately about? Talk to your doctor if you are pregnant, planning to become pregnant, or breastfeeding. Stop the medication and tell your doctor or seek emergency medical help if you notice any signs/symptoms of an allergic reaction (severe rash, redness, hives, severe itching, trouble breathing, or swelling of the face, lips, or tongue). If you have a rubber or latex allergy, you should not use the Repatha SureClick® Autoinjector pen or the prefilled syringe, please notify your doctor or pharmacist.   What are things that I should be cautious of? Be cautious of any side effects from this medication. Talk to your doctor if any new ones develop or aren't getting better.   What are some medications that can interact with this one? There are no known significant drug interactions with Repatha. Always tell your doctor or pharmacist immediately if you start taking any new medications, including over-the-counter medications, vitamins, and herbal supplements.      Medication Storage/Handling   How should I handle this medication? Do not shake or expose the pens, cartridges, or syringes to extreme heat or direct sunlight. Keep this medication out of reach of pets/children. Allow medication to warm at room temperature prior to administration.   How does this medication need to be stored? Store unused pens, cartridges, or syringes in the refrigerator in the original cartons to protect from light. If needed, Repatha may be kept at room temperature in the original carton for up to 30 days. Do not freeze.    How should I dispose of this medication? All the Repatha devices are single-dose and should be discarded in a sharps container after use. If your doctor decides to stop this medication, take to your local police station for proper disposal. Some pharmacies also have take-back bins for medication drop-off.      Resources/Support   How can  I remind myself to take this medication? You can download reminder apps to help you manage your refills. You may also set an alarm on your phone to remind you to take your dose.    Is financial support available?  [employer] can provide co-pay cards if you have commercial insurance or patient assistance if you have Medicare or no insurance.    Which vaccines are recommended for me? Talk to your doctor about these vaccines: Flu, Coronavirus (COVID-19), Pneumococcal (pneumonia), Tdap, Hepatitis B, Zoster (shingles)          Adherence and Self-Administration  Adherence related to the patient's specialty therapy was discussed with the patient. The Adherence segment of this outreach has been reviewed and updated.     Is there a concern with patient's ability to self administer the medication correctly and without issue?: No  Were any potential barriers to adherence identified during the initial assessment or patient education?: No  Are there any concerns regarding the patient's understanding of the importance of medication adherence?: No  Methods for Supporting Patient Adherence and/or Self-Administration: None; patient has been using Praluent for a long time. Insurance no longer covering Praluent.     Open Medication Therapy Problems  No medication therapy recommendations to display    Goals of Therapy  Goals related to the patient's specialty therapy were discussed with the patient. The Patient Goals segment of this outreach has been reviewed and updated.   Goals Addressed Today        Specialty Pharmacy General Goal      LDL Goal < 55    Lab Results   Component Value Date     (H) 12/15/2016                  Reassessment Plan & Follow-Up  1. Medication Therapy Changes: Begin Repatha 140mg subcutaneously every 14 days  2. Related Plans, Therapy Recommendations, or Therapy Problems to Be Addressed: None  3. Pharmacist to perform regular assessments no more than (6) months from the previous assessment.  4. Care  Coordinator to set up future refill outreaches, coordinate prescription delivery, and escalate clinical questions to pharmacist.  5. Welcome information and patient satisfaction survey to be sent by specialty pharmacy team with patient's initial fill.    Attestation  Therapeutic appropriateness: Appropriate   I attest the patient was actively involved in and has agreed to the above plan of care. If the prescribed therapy is at any point deemed not appropriate based on the current or future assessments, a consultation will be initiated with the patient's specialty care provider to determine the best course of action. The revised plan of therapy will be documented along with any required assessments and/or additional patient education provided.     Ryann Peterson, PharmD, [facility]  Clinical Specialty Pharmacist, Cardiology  8/1/2024  09:54 EDT

## 2024-08-23 ENCOUNTER — SPECIALTY PHARMACY (OUTPATIENT)
Dept: CARDIOLOGY | Facility: CLINIC | Age: 84
End: 2024-08-23
Payer: MEDICARE

## 2024-08-23 NOTE — PROGRESS NOTES
Specialty Pharmacy Refill Coordination Note     Chino is a 83 y.o. male contacted today regarding refills of Repatha 140 mg SC every 14 days specialty medication(s).    Shipping Monday for delivery on Tuesday, 8/27/24.    Specialty medication(s) and dose(s) confirmed: yes    Refill Questions      Flowsheet Row Most Recent Value   Changes to allergies? No   Changes to medications? No   New conditions or infections since last clinic visit No   Unplanned office visit, urgent care, ED, or hospital admission in the last 4 weeks  No   How does patient/caregiver feel medication is working? Very good   Financial problems or insurance changes  No   Since the previous refill, were any specialty medication doses or scheduled injections missed or delayed?  No   Does this patient require a clinical escalation to a pharmacist? No            Delivery Questions      Flowsheet Row Most Recent Value   Delivery method [employer]   Delivery address verified with patient/caregiver? Yes   Delivery address Home   Number of medications in delivery 1   Medication(s) being filled and delivered Evolocumab   Doses left of specialty medications 0   Copay verified? Yes   Copay amount $0   Copay form of payment No copayment ($0)   Ship Date 8/26/24   Delivery Date 8/27/24   Signature Required No                   Follow-up: 28 day(s)     Lorri Yousif, Pharmacy Technician  Specialty Pharmacy Technician

## 2024-09-16 ENCOUNTER — SPECIALTY PHARMACY (OUTPATIENT)
Dept: CARDIOLOGY | Facility: CLINIC | Age: 84
End: 2024-09-16
Payer: MEDICARE

## 2024-10-11 ENCOUNTER — SPECIALTY PHARMACY (OUTPATIENT)
Dept: CARDIOLOGY | Facility: CLINIC | Age: 84
End: 2024-10-11
Payer: MEDICARE

## 2024-10-11 NOTE — PROGRESS NOTES
Specialty Pharmacy Refill Coordination Note     Chino is a 83 y.o. male contacted today regarding refills of Repatha 140 mg SC every 14 days specialty medication(s).    Delivery scheduled for Tuesday.    Specialty medication(s) and dose(s) confirmed: yes    Refill Questions      Flowsheet Row Most Recent Value   Changes to allergies? No   Changes to medications? No   New conditions or infections since last clinic visit No   Unplanned office visit, urgent care, ED, or hospital admission in the last 4 weeks  No   How does patient/caregiver feel medication is working? Very good   Financial problems or insurance changes  No   Since the previous refill, were any specialty medication doses or scheduled injections missed or delayed?  No   Does this patient require a clinical escalation to a pharmacist? No            Delivery Questions      Flowsheet Row Most Recent Value   Delivery method UPS   Delivery address verified with patient/caregiver? Yes   Delivery address Home   Number of medications in delivery 1   Medication(s) being filled and delivered Evolocumab   Doses left of specialty medications 0 pen   Copay verified? Yes   Copay amount 0.00   Copay form of payment No copayment ($0)   Ship Date 10/14/2024   Delivery Date 10/15/2024   Signature Required No                   Follow-up: 28 day(s)     Lorri Yousif, Pharmacy Technician  Specialty Pharmacy Technician

## 2024-11-08 ENCOUNTER — SPECIALTY PHARMACY (OUTPATIENT)
Dept: CARDIOLOGY | Facility: CLINIC | Age: 84
End: 2024-11-08
Payer: MEDICARE

## 2024-11-08 NOTE — PROGRESS NOTES
Specialty Pharmacy Refill Coordination Note     Chino is a 83 y.o. male contacted today regarding refills of Repatha 140 mg SC every 14 days specialty medication(s).    Shipping 11/11/24 for 11/12/24 delivery. Next dose due 11/15/24.    Specialty medication(s) and dose(s) confirmed: yes    Refill Questions      Flowsheet Row Most Recent Value   Changes to allergies? No   Changes to medications? No   New conditions or infections since last clinic visit No   Unplanned office visit, urgent care, ED, or hospital admission in the last 4 weeks  No   How does patient/caregiver feel medication is working? Very good   Financial problems or insurance changes  No   Since the previous refill, were any specialty medication doses or scheduled injections missed or delayed?  No   Does this patient require a clinical escalation to a pharmacist? No            Delivery Questions      Flowsheet Row Most Recent Value   Delivery method UPS   Delivery address verified with patient/caregiver? Yes   Delivery address Home   Number of medications in delivery 1   Medication(s) being filled and delivered Evolocumab (REPATHA)   Doses left of specialty medications 0 pen   Copay verified? Yes   Copay amount 0.00   Copay form of payment No copayment ($0)   Ship Date 11/11/24   Delivery Date 11/12/24   Signature Required No                   Follow-up: 28 day(s)     Lorri Yousif, Pharmacy Technician  Specialty Pharmacy Technician

## 2024-12-11 ENCOUNTER — SPECIALTY PHARMACY (OUTPATIENT)
Dept: CARDIOLOGY | Facility: CLINIC | Age: 84
End: 2024-12-11
Payer: MEDICARE

## 2024-12-11 NOTE — PROGRESS NOTES
Specialty Pharmacy Refill Coordination Note     Chino is a 84 y.o. male contacted today regarding refills of Repatha 140 mg SC every 14 days specialty medication(s).    Spoke with patient daughter, delivery scheduled for tomorrow.    Specialty medication(s) and dose(s) confirmed: yes    Refill Questions      Flowsheet Row Most Recent Value   Changes to allergies? No   Changes to medications? No   New conditions or infections since last clinic visit No   Unplanned office visit, urgent care, ED, or hospital admission in the last 4 weeks  No   How does patient/caregiver feel medication is working? Very good   Financial problems or insurance changes  No   Since the previous refill, were any specialty medication doses or scheduled injections missed or delayed?  No   Does this patient require a clinical escalation to a pharmacist? No            Delivery Questions      Flowsheet Row Most Recent Value   Delivery method UPS   Delivery address verified with patient/caregiver? Yes   Delivery address Home   Number of medications in delivery 1   Medication(s) being filled and delivered Evolocumab (REPATHA)   Doses left of specialty medications 0   Copay verified? Yes   Copay amount 0.00   Copay form of payment No copayment ($0)   Ship Date 12/11/24   Delivery Date 12/12/24   Signature Required No                   Follow-up: 28 day(s)     Lorri Yousif, Pharmacy Technician  Specialty Pharmacy Technician

## 2024-12-17 ENCOUNTER — HOSPITAL ENCOUNTER (OUTPATIENT)
Dept: GENERAL RADIOLOGY | Facility: HOSPITAL | Age: 84
Discharge: HOME | End: 2024-12-17
Payer: MEDICARE

## 2024-12-17 ENCOUNTER — TRANSCRIBE ORDERS (OUTPATIENT)
Dept: LAB | Facility: HOSPITAL | Age: 84
End: 2024-12-17
Payer: MEDICARE

## 2024-12-17 DIAGNOSIS — M79.605 LEFT LEG PAIN: ICD-10-CM

## 2024-12-17 DIAGNOSIS — M79.605 LEFT LEG PAIN: Primary | ICD-10-CM

## 2024-12-17 PROCEDURE — 73503 X-RAY EXAM HIP UNI 4/> VIEWS: CPT

## 2024-12-17 PROCEDURE — 73503 X-RAY EXAM HIP UNI 4/> VIEWS: CPT | Performed by: RADIOLOGY

## 2024-12-17 PROCEDURE — 72110 X-RAY EXAM L-2 SPINE 4/>VWS: CPT

## 2024-12-17 PROCEDURE — 72110 X-RAY EXAM L-2 SPINE 4/>VWS: CPT | Performed by: RADIOLOGY

## 2025-01-21 ENCOUNTER — SPECIALTY PHARMACY (OUTPATIENT)
Dept: CARDIOLOGY | Facility: CLINIC | Age: 85
End: 2025-01-21
Payer: MEDICARE

## 2025-01-21 NOTE — PROGRESS NOTES
Specialty Pharmacy Refill Coordination Note     Chino is a 84 y.o. male contacted today regarding refills of Repatha 140 mg SC every 14 days specialty medication(s).    Delivery scheduled for tomorrow, patient daughter/caregiver is aware.    Specialty medication(s) and dose(s) confirmed: yes    Refill Questions      Flowsheet Row Most Recent Value   Changes to allergies? No   Changes to medications? No   New conditions or infections since last clinic visit No   Unplanned office visit, urgent care, ED, or hospital admission in the last 4 weeks  No   How does patient/caregiver feel medication is working? Very good   Financial problems or insurance changes  No   Since the previous refill, were any specialty medication doses or scheduled injections missed or delayed?  No   Does this patient require a clinical escalation to a pharmacist? No            Delivery Questions      Flowsheet Row Most Recent Value   Delivery method UPS   Delivery address verified with patient/caregiver? Yes   Delivery address Home   Number of medications in delivery 1   Medication(s) being filled and delivered Evolocumab (REPATHA)   Doses left of specialty medications 1 pen   Copay verified? Yes   Copay amount 0.00   Copay form of payment No copayment ($0)   Ship Date 1/21/25   Delivery Date Selection 01/22/25   Signature Required No                   Follow-up: 28 day(s)     Lorri Yousif, Pharmacy Technician  Specialty Pharmacy Technician

## 2025-01-23 NOTE — PROGRESS NOTES
Specialty Pharmacy Patient Management Program  Cardiology Reassessment     Chino Bailey was referred by a Cardiology provider to the Cardiology Patient Management program offered by [facility] for Hyperlipidemia. A follow-up outreach was conducted, including assessment of continued therapy appropriateness, medication adherence, and side effect incidence and management for Repatha.    Changes to Insurance Coverage or Financial Support  No changes    Relevant Past Medical History and Comorbidities  Relevant medical history and concomitant health conditions were discussed with the patient. The patient's chart has been reviewed for relevant past medical history and comorbid health conditions and updated as necessary.   Past Medical History:   Diagnosis Date    Chronic kidney disease, stage 3     COVID-19 07/20/2021    GERD (gastroesophageal reflux disease)     Mixed dyslipidemia     Mixed hypercholesterolemia with intolerance to statin, subhash jaundice, due to elevated LFTs.    Osteoarthritis     Seasonal allergies     ST elevation myocardial infarction (STEMI) of anterolateral wall 09/2004     Social History     Socioeconomic History    Marital status:    Tobacco Use    Smoking status: Former     Types: Cigarettes     Passive exposure: Past    Smokeless tobacco: Former     Types: Chew     Quit date: 12/15/1966   Vaping Use    Vaping status: Never Used   Substance and Sexual Activity    Alcohol use: No    Drug use: No    Sexual activity: Defer          Hospitalizations and Urgent Care Since Last Assessment  ED Visits, Admissions, or Hospitalizations: No  Urgent Office Visits: No    Allergies  Known allergies and reactions were discussed with the patient. The patient's chart has been reviewed for allergy information and updated as necessary.   Allergies   Allergen Reactions    Statins Other (See Comments)     Skin turned yellow per p,t jaundice     Allergies reviewed by Ryann Peterson,  PharmD on 1/23/2025 at  2:03 PM    Relevant Laboratory Values  Relevant laboratory values were discussed with the patient. The following specialty medication dose adjustment(s) are recommended: No changes    Lab Results   Component Value Date    GLUCOSE 88 12/16/2016    CALCIUM 9.5 12/16/2016     12/16/2016    K 3.8 12/16/2016    CO2 26.0 12/16/2016     12/16/2016    BUN 18 12/16/2016    CREATININE 1.20 12/16/2016    EGFRIFNONA 59 (L) 12/16/2016    BCR 15.0 12/16/2016    ANIONGAP 6.0 12/16/2016     Lab Results   Component Value Date    CHOL 209 (H) 12/15/2016    TRIG 96 12/15/2016    HDL 51 12/15/2016     (H) 12/15/2016       Current Medication List  This medication list has been reviewed with the patient and evaluated for any interactions or necessary modifications/recommendations, and updated to include all prescription medications, OTC medications, and supplements the patient is currently taking.  This list reflects what is contained in the patient's profile, which has also been marked as reviewed to communicate to other providers it is the most up to date version of the patient's current medication therapy.     Current Outpatient Medications:     ammonium lactate (AMLACTIN) 12 % cream, Apply 1 Application topically to the appropriate area as directed Daily., Disp: , Rfl:     aspirin 325 MG tablet, Take 1 tablet by mouth Daily., Disp: 90 tablet, Rfl: 3    celecoxib (CeleBREX) 100 MG capsule, Take 1 capsule by mouth 2 (Two) Times a Day., Disp: , Rfl:     chlorthalidone (HYGROTON) 25 MG tablet, Take 1 tablet by mouth Daily., Disp: 90 tablet, Rfl: 3    colchicine 0.6 MG tablet, Take 1 tablet by mouth As Needed., Disp: , Rfl:     Diclofenac Sodium (VOLTAREN) 1 % gel gel, Apply 1 g topically to the appropriate area as directed As Needed., Disp: , Rfl:     esomeprazole (NexIUM) 40 MG capsule, Take 1 capsule by mouth Every Morning Before Breakfast., Disp: , Rfl:     Evolocumab (REPATHA) solution  auto-injector SureClick injection, Inject 1 mL under the skin into the appropriate area as directed Every 14 (Fourteen) Days., Disp: 2 mL, Rfl: 11    febuxostat (ULORIC) 80 MG tablet tablet, Take 1 tablet by mouth Daily., Disp: , Rfl:     fluticasone (FLONASE) 50 MCG/ACT nasal spray, 2 sprays into the nostril(s) as directed by provider Daily. Administer 2 sprays in each nostril for each dose., Disp: , Rfl:     hydrOXYzine (VISTARIL) 50 MG capsule, Take 1 capsule by mouth Every Night., Disp: , Rfl:     isosorbide mononitrate (IMDUR) 30 MG 24 hr tablet, Take 1 tablet by mouth Daily. For heart, Disp: 90 tablet, Rfl: 3    metoprolol succinate XL (TOPROL-XL) 50 MG 24 hr tablet, Take 1 tablet by mouth Daily., Disp: 90 tablet, Rfl: 3    nitroglycerin (NITROSTAT) 0.4 MG SL tablet, PLACE 1 TABLET UNDER THE TONGUE EVERY 5 (FIVE) MINUTES AS NEEDED FOR CHEST PAIN. TAKE NO MORE, Disp: 25 tablet, Rfl: 0    tamsulosin (FLOMAX) 0.4 MG capsule 24 hr capsule, Take 1 capsule by mouth., Disp: , Rfl:     Medicines reviewed by Ryann Peterson, PharmD on 1/23/2025 at  2:04 PM    Drug Interactions  None    Adverse Drug Reactions  Medication tolerability: Tolerating with no to minimal ADRs  Medication plan: Continue therapy with normal follow-up  Plan for ADR Management: N/A    Adherence, Self-Administration, and Current Therapy Problems  Adherence related to the patient's specialty therapy was discussed with the patient. The Adherence segment of this outreach has been reviewed and updated.     Adherence Questions  Linked Medication(s) Assessed: Evolocumab (REPATHA)  On average, how many doses/injections does the patient miss per month?: 0  What are the identified reasons for non-adherence or missed doses? : no problems identified  What is the estimated medication adherence level?: 70-79%  Based on the patient/caregiver response and refill history, does this patient require an MTP to track adherence improvements?: no (Patient had extra  pens from before [facility] took over care and he didn't need refills every 28 days.)    Additional Barriers to Patient Self-Administration: N/A  Methods for Supporting Patient Self-Administration: N/A    Open Medication Therapy Problems  No medication therapy recommendations to display    Goals of Therapy  Goals related to the patient's specialty therapy were discussed with the patient. The Patient Goals segment of this outreach has been reviewed and updated.   Goals Addressed Today        Specialty Pharmacy General Goal      LDL Goal < 55    Lab Results   Component Value Date     (H) 12/15/2016     1/23/25: Labs drawn at [facility] lab on 9/17/24 show LDL of 33 mg/dL. Continue Repatha.               Quality of Life Assessment   Quality of Life related to the patient's enrollment in the patient management program and services provided was discussed with the patient. The QOL segment of this outreach has been reviewed and updated.  Quality of Life Improvement Scale: 8-Moderately better    Reassessment Plan & Follow-Up  1. Medication Therapy Changes: Continue Repatha 140mg subcutaneous every 14 days   2. Related Plans, Therapy Recommendations, or Issues to Be Addressed: None  3. Pharmacist to perform regular assessments no more than (6) months from the previous assessment.  4. Care Coordinator to set up future refill outreaches, coordinate prescription delivery, and escalate clinical questions to pharmacist.    Attestation  Therapeutic appropriateness: Appropriate   I attest the patient was actively involved in and has agreed to the above plan of care.  If the prescribed therapy is at any point deemed not appropriate based on the current or future assessments, a consultation will be initiated with the patient's specialty care provider to determine the best course of action. The revised plan of therapy will be documented along with any required assessments and/or additional patient education provided.     Ryann Peterson,  PharmD, BCPS  Clinical Specialty Pharmacist, Cardiology  1/23/2025  14:05 EST

## 2025-02-17 ENCOUNTER — SPECIALTY PHARMACY (OUTPATIENT)
Dept: CARDIOLOGY | Facility: CLINIC | Age: 85
End: 2025-02-17
Payer: MEDICARE

## 2025-02-17 NOTE — PROGRESS NOTES
Specialty Pharmacy Patient Management Program  Cardiology Specialty Pharmacy Refill Outreach      Chino is a 84 y.o. male contacted today regarding refills of his medication(s).    Specialty medication(s) and dose(s) confirmed: Repatha 140 mg SC every 14 days    Other medications being refilled: n/a    Refill Questions      Flowsheet Row Most Recent Value   Changes to allergies? No   Changes to medications? No   New conditions or infections since last clinic visit No   Unplanned office visit, urgent care, ED, or hospital admission in the last 4 weeks  No   How does patient/caregiver feel medication is working? Very good   Financial problems or insurance changes  No   Since the previous refill, were any specialty medication doses or scheduled injections missed or delayed?  No   Does this patient require a clinical escalation to a pharmacist? No            Delivery Questions      Flowsheet Row Most Recent Value   Delivery method UPS   Delivery address verified with patient/caregiver? Yes   Delivery address Home   Number of medications in delivery 1   Medication(s) being filled and delivered Evolocumab (REPATHA)   Doses left of specialty medications 0   Copay verified? Yes   Copay amount 0.00   Copay form of payment No copayment ($0)   Ship Date 2/17/25   Delivery Date Selection 02/18/25   Signature Required No                   Follow-up: 28 days     Lorri Yousif CPhT  Pharmacy Care Coordinator  2/17/2025  10:13 EST

## 2025-03-19 ENCOUNTER — SPECIALTY PHARMACY (OUTPATIENT)
Dept: CARDIOLOGY | Facility: CLINIC | Age: 85
End: 2025-03-19
Payer: MEDICARE

## 2025-06-03 RX ORDER — ASPIRIN 325 MG
TABLET ORAL
Qty: 90 TABLET | Refills: 0 | Status: SHIPPED | OUTPATIENT
Start: 2025-06-03

## 2025-06-06 ENCOUNTER — SPECIALTY PHARMACY (OUTPATIENT)
Facility: HOSPITAL | Age: 85
End: 2025-06-06
Payer: MEDICARE

## 2025-06-06 NOTE — PROGRESS NOTES
Specialty Pharmacy Patient Management Program  Cardiology Reassessment     Chino Bailey was referred by a Cardiology provider to the Cardiology Patient Management program offered by [facility] for Hyperlipidemia. A follow-up outreach was conducted, including assessment of continued therapy appropriateness, medication adherence, and side effect incidence and management for Repatha.    Changes to Insurance Coverage or Financial Support  No changes    Relevant Past Medical History and Comorbidities  Relevant medical history and concomitant health conditions were discussed with the patient. The patient's chart has been reviewed for relevant past medical history and comorbid health conditions and updated as necessary.   Past Medical History:   Diagnosis Date    Chronic kidney disease, stage 3     COVID-19 07/20/2021    GERD (gastroesophageal reflux disease)     Mixed dyslipidemia     Mixed hypercholesterolemia with intolerance to statin, subhash jaundice, due to elevated LFTs.    Osteoarthritis     Seasonal allergies     ST elevation myocardial infarction (STEMI) of anterolateral wall 09/2004     Social History     Socioeconomic History    Marital status:    Tobacco Use    Smoking status: Former     Types: Cigarettes     Passive exposure: Past    Smokeless tobacco: Former     Types: Chew     Quit date: 12/15/1966   Vaping Use    Vaping status: Never Used   Substance and Sexual Activity    Alcohol use: No    Drug use: No    Sexual activity: Defer     Problem list reviewed by Ryann Peterson, PharmD on 6/6/2025 at 11:20 AM    Hospitalizations and Urgent Care Since Last Assessment  ED Visits, Admissions, or Hospitalizations: None  Urgent Office Visits: None    Allergies  Known allergies and reactions were discussed with the patient. The patient's chart has been reviewed for allergy information and updated as necessary.   Allergies   Allergen Reactions    Statins Other (See Comments)     Skin  turned yellow per p,t jaundice     Allergies reviewed by Ryann Peterson, PharmD on 6/6/2025 at 11:20 AM    Relevant Laboratory Values  Relevant laboratory values were discussed with the patient. The following specialty medication dose adjustment(s) are recommended: No changes    Lab Results   Component Value Date    GLUCOSE 88 12/16/2016    CALCIUM 9.5 12/16/2016     12/16/2016    K 3.8 12/16/2016    CO2 26.0 12/16/2016     12/16/2016    BUN 18 12/16/2016    CREATININE 1.20 12/16/2016    EGFRIFNONA 59 (L) 12/16/2016    BCR 15.0 12/16/2016    ANIONGAP 6.0 12/16/2016     Lab Results   Component Value Date    CHOL 209 (H) 12/15/2016    TRIG 96 12/15/2016    HDL 51 12/15/2016     (H) 12/15/2016       Current Medication List  This medication list has been reviewed with the patient and evaluated for any interactions or necessary modifications/recommendations, and updated to include all prescription medications, OTC medications, and supplements the patient is currently taking.  This list reflects what is contained in the patient's profile, which has also been marked as reviewed to communicate to other providers it is the most up to date version of the patient's current medication therapy.     Current Outpatient Medications:     ammonium lactate (AMLACTIN) 12 % cream, Apply 1 Application topically to the appropriate area as directed Daily., Disp: , Rfl:     aspirin 325 MG tablet, TAKE 1 TABLET BY MOUTH EVERY DAY FOR CIRCULATION, Disp: 90 tablet, Rfl: 0    celecoxib (CeleBREX) 100 MG capsule, Take 1 capsule by mouth 2 (Two) Times a Day., Disp: , Rfl:     chlorthalidone (HYGROTON) 25 MG tablet, Take 1 tablet by mouth Daily., Disp: 90 tablet, Rfl: 3    colchicine 0.6 MG tablet, Take 1 tablet by mouth As Needed., Disp: , Rfl:     Diclofenac Sodium (VOLTAREN) 1 % gel gel, Apply 1 g topically to the appropriate area as directed As Needed., Disp: , Rfl:     esomeprazole (NexIUM) 40 MG capsule, Take 1 capsule  by mouth Every Morning Before Breakfast., Disp: , Rfl:     Evolocumab (REPATHA) solution auto-injector SureClick injection, Inject 1 mL under the skin into the appropriate area as directed Every 14 (Fourteen) Days., Disp: 2 mL, Rfl: 11    febuxostat (ULORIC) 80 MG tablet tablet, Take 1 tablet by mouth Daily., Disp: , Rfl:     fluticasone (FLONASE) 50 MCG/ACT nasal spray, 2 sprays into the nostril(s) as directed by provider Daily. Administer 2 sprays in each nostril for each dose., Disp: , Rfl:     hydrOXYzine (VISTARIL) 50 MG capsule, Take 1 capsule by mouth Every Night., Disp: , Rfl:     isosorbide mononitrate (IMDUR) 30 MG 24 hr tablet, Take 1 tablet by mouth Daily. For heart, Disp: 90 tablet, Rfl: 3    metoprolol succinate XL (TOPROL-XL) 50 MG 24 hr tablet, Take 1 tablet by mouth Daily., Disp: 90 tablet, Rfl: 3    nitroglycerin (NITROSTAT) 0.4 MG SL tablet, PLACE 1 TABLET UNDER THE TONGUE EVERY 5 (FIVE) MINUTES AS NEEDED FOR CHEST PAIN. TAKE NO MORE, Disp: 25 tablet, Rfl: 0    tamsulosin (FLOMAX) 0.4 MG capsule 24 hr capsule, Take 1 capsule by mouth., Disp: , Rfl:     Medicines reviewed by Ryann Peterson, PharmD on 6/6/2025 at 11:20 AM    Drug Interactions  None    Adverse Drug Reactions  Medication tolerability: Tolerating with no to minimal ADRs  Medication plan: Continue therapy with normal follow-up  Plan for ADR Management: N/A    Adherence, Self-Administration, and Current Therapy Problems  Adherence related to the patient's specialty therapy was discussed with the patient. The Adherence segment of this outreach has been reviewed and updated.     Adherence Questions  Linked Medication(s) Assessed: Evolocumab (REPATHA)  On average, how many doses/injections does the patient miss per month?: 0  What are the identified reasons for non-adherence or missed doses? : no problems identified  What is the estimated medication adherence level?: 80-89%  Based on the patient/caregiver response and refill history, does  this patient require an MTP to track adherence improvements?: no    Additional Barriers to Patient Self-Administration: N/A  Methods for Supporting Patient Self-Administration: N/A    Open Medication Therapy Problems  No medication therapy recommendations to display    Goals of Therapy  Goals related to the patient's specialty therapy were discussed with the patient. The Patient Goals segment of this outreach has been reviewed and updated.   Goals Addressed Today        Specialty Pharmacy General Goal      LDL Goal < 55    Lab Results   Component Value Date     (H) 12/15/2016     1/23/25: Labs drawn at [facility] lab on 9/17/24 show LDL of 33 mg/dL. Continue Repatha.   6/6/25: Last LDL at goal; has a follow-up with cardiology on 6/17/25- recommend a repeat lipid panel at that time. Continue Repatha.               Quality of Life Assessment   Quality of Life related to the patient's enrollment in the patient management program and services provided was discussed with the patient. The QOL segment of this outreach has been reviewed and updated.  Quality of Life Improvement Scale: 8-Moderately better    Reassessment Plan & Follow-Up  1. Medication Therapy Changes: Continue Repatha 140mg subcutaneous every 14 days   2. Related Plans, Therapy Recommendations, or Issues to Be Addressed: None  3. Pharmacist to perform regular assessments no more than (6) months from the previous assessment.  4. Care Coordinator to set up future refill outreaches, coordinate prescription delivery, and escalate clinical questions to pharmacist.    Attestation  Therapeutic appropriateness: Appropriate   I attest the patient was actively involved in and has agreed to the above plan of care.  If the prescribed therapy is at any point deemed not appropriate based on the current or future assessments, a consultation will be initiated with the patient's specialty care provider to determine the best course of action. The revised plan of therapy will  be documented along with any required assessments and/or additional patient education provided.     Ryann Peterson, PharmD, BCPS  Clinical Specialty Pharmacist, Cardiology  6/6/2025  11:22 EDT

## 2025-06-10 ENCOUNTER — SPECIALTY PHARMACY (OUTPATIENT)
Dept: CARDIOLOGY | Facility: CLINIC | Age: 85
End: 2025-06-10
Payer: MEDICARE

## 2025-06-17 ENCOUNTER — OFFICE VISIT (OUTPATIENT)
Age: 85
End: 2025-06-17
Payer: MEDICARE

## 2025-06-17 VITALS
HEART RATE: 63 BPM | OXYGEN SATURATION: 95 % | SYSTOLIC BLOOD PRESSURE: 124 MMHG | HEIGHT: 65 IN | DIASTOLIC BLOOD PRESSURE: 74 MMHG | BODY MASS INDEX: 31.49 KG/M2 | WEIGHT: 189 LBS

## 2025-06-17 DIAGNOSIS — E78.5 DYSLIPIDEMIA: ICD-10-CM

## 2025-06-17 DIAGNOSIS — I25.810 CORONARY ARTERY DISEASE INVOLVING CORONARY BYPASS GRAFT OF NATIVE HEART WITHOUT ANGINA PECTORIS: Primary | ICD-10-CM

## 2025-06-17 DIAGNOSIS — I10 ESSENTIAL HYPERTENSION: ICD-10-CM

## 2025-06-17 RX ORDER — OXYBUTYNIN CHLORIDE 5 MG/1
5 TABLET, EXTENDED RELEASE ORAL DAILY
COMMUNITY
Start: 2025-03-17

## 2025-06-17 RX ORDER — CHLORTHALIDONE 25 MG/1
25 TABLET ORAL DAILY
Qty: 90 TABLET | Refills: 3 | Status: SHIPPED | OUTPATIENT
Start: 2025-06-17

## 2025-06-17 RX ORDER — ISOSORBIDE MONONITRATE 30 MG/1
30 TABLET, EXTENDED RELEASE ORAL DAILY
Qty: 90 TABLET | Refills: 3 | Status: SHIPPED | OUTPATIENT
Start: 2025-06-17

## 2025-06-17 RX ORDER — LEVOCETIRIZINE DIHYDROCHLORIDE 5 MG/1
TABLET, FILM COATED ORAL
COMMUNITY
Start: 2025-06-09

## 2025-06-17 RX ORDER — METOPROLOL SUCCINATE 50 MG/1
50 TABLET, EXTENDED RELEASE ORAL DAILY
Qty: 90 TABLET | Refills: 3 | Status: SHIPPED | OUTPATIENT
Start: 2025-06-17

## 2025-06-17 NOTE — PROGRESS NOTES
[facility] Cardiology    Encounter Date: 2025    Patient ID: Chino Bailey is a 84 y.o. male.  : 1940     PCP: Kaycee Corrigan APRN       Chief Complaint: Coronary Artery Disease (Coronary artery disease involving coronary bypass graft of native heart without angina pectoris//)      PROBLEM LIST:  Coronary artery disease:  Anterolateral ST segment elevation  MI in 2004.  [facility], 2004: EF 50% with moderate inferior hypokinesis, 80% mid LAD stenosis with 95% D1 stenosis, 70% OM and 70% mid to distal circumflex.   [facility], 2004, Dr. Mireles: D1 s/p 2.5 x 13 mm Cypher stent. LAD s/p 3.0 x 18 mm Cypher.   [facility], 10/05/2004, Dr. Johnson: Distal LCx s/p 2.5 x 24 Taxus stent. OM1 s/p 2.5 x 20 mm Taxus stent.  [facility], 2006 was negative for ischemia. EF 64%.  [facility], 2007, Dr. Johnson: 70% focal mid-segment LAD stenosis s/p 3.0 x 8 Taxus stent. 99% stenosis of the small proximal right PDA s/p balloon angioplasty with excellent results. All previous stents were patent. EF normal.  Cardiolite stress test on 2011: Negative for ischemia and showed EF of 69%.    Angina with hospital presentation to [facility]; MI excluded.  Cardiolite stress test on 2013:  Negative for ischemia and  showed normal LV function.    [facility], 12/15/16: Severe one vessel CAD. 95% stenosis of first OM branch of LCx. Moderate disease of LAD and RCA., LCx. BETH to first OM (Xience 2.5 x 12 mm)  [facility]cho, 2023: EF >55%. Mild AR/NC/TR. Trace to mild MR. AV sclerosis.   HTN.  Mixed hypercholesterolemia with intolerance to statin, subhash jaundice, due to elevated LFTs.  Osteoarthritis.  Seasonal allergies.  GERD.  Status post cholecystectomy.  Proteinuria.  Chronic kidney disease, stage 3.    History of Present Illness  Patient presents today for a follow-up with a history of CAD and cardiac risk factors. Since last visit, Patient has been doing well from a cardiac standpoint.   Active and busy in daily life but does not have a regular exercise routine.  Tolerates activity well. No ER visits or hospitalizations. Has been going to PT for pain in his legs and he feels like this is helping. Daughter reports that he has been breathing louder and has seemed more short of breath than normal.They report that he is hard of hearing and has just been breathing louder. He did go fishing last weekend and had some chest and back soreness with arm movement after this but none with exertion.       Allergies   Allergen Reactions    Statins Other (See Comments)     Skin turned yellow per p,t jaundice         Current Outpatient Medications:     ammonium lactate (AMLACTIN) 12 % cream, Apply 1 Application topically to the appropriate area as directed Daily., Disp: , Rfl:     aspirin 325 MG tablet, TAKE 1 TABLET BY MOUTH EVERY DAY FOR CIRCULATION, Disp: 90 tablet, Rfl: 0    celecoxib (CeleBREX) 100 MG capsule, Take 1 capsule by mouth 2 (Two) Times a Day., Disp: , Rfl:     chlorthalidone (HYGROTON) 25 MG tablet, Take 1 tablet by mouth Daily., Disp: 90 tablet, Rfl: 3    colchicine 0.6 MG tablet, Take 1 tablet by mouth As Needed., Disp: , Rfl:     Diclofenac Sodium (VOLTAREN) 1 % gel gel, Apply 1 g topically to the appropriate area as directed As Needed., Disp: , Rfl:     esomeprazole (NexIUM) 40 MG capsule, Take 1 capsule by mouth Every Morning Before Breakfast., Disp: , Rfl:     Evolocumab (REPATHA) solution auto-injector SureClick injection, Inject 1 mL under the skin into the appropriate area as directed Every 14 (Fourteen) Days., Disp: 6 mL, Rfl: 4    febuxostat (ULORIC) 80 MG tablet tablet, Take 1 tablet by mouth Daily., Disp: , Rfl:     fluticasone (FLONASE) 50 MCG/ACT nasal spray, Administer 2 sprays into the nostril(s) as directed by provider Daily. Administer 2 sprays in each nostril for each dose., Disp: , Rfl:     hydrOXYzine (VISTARIL) 50 MG capsule, Take 1 capsule by mouth Every Night., Disp: , Rfl:  "    isosorbide mononitrate (IMDUR) 30 MG 24 hr tablet, Take 1 tablet by mouth Daily. For heart, Disp: 90 tablet, Rfl: 3    levocetirizine (XYZAL) 5 MG tablet, , Disp: , Rfl:     metoprolol succinate XL (TOPROL-XL) 50 MG 24 hr tablet, Take 1 tablet by mouth Daily., Disp: 90 tablet, Rfl: 3    nitroglycerin (NITROSTAT) 0.4 MG SL tablet, PLACE 1 TABLET UNDER THE TONGUE EVERY 5 (FIVE) MINUTES AS NEEDED FOR CHEST PAIN. TAKE NO MORE, Disp: 25 tablet, Rfl: 0    oxybutynin XL (DITROPAN-XL) 5 MG 24 hr tablet, Take 1 tablet by mouth Daily., Disp: , Rfl:     tamsulosin (FLOMAX) 0.4 MG capsule 24 hr capsule, Take 1 capsule by mouth., Disp: , Rfl:     The following portions of the patient's history were reviewed and updated as appropriate: allergies, current medications, past family history, past medical history, past social history, past surgical history and problem list.    ROS  Review of Systems   14 point ROS negative except for that listed in the HPI.         Objective:     /74 (BP Location: Right arm, Patient Position: Sitting, Cuff Size: Adult)   Pulse 63   Ht 165.1 cm (65\")   Wt 85.7 kg (189 lb)   SpO2 95%   BMI 31.45 kg/m²      Physical Exam  Constitutional: Patient appears well-developed and well-nourished.   HENT: HEENT exam unremarkable.   Neck: Neck supple. No JVD present.   Cardiovascular: Normal rate, regular rhythm and normal heart sounds. No murmur heard.   2+ symmetric pulses.   Pulmonary/Chest: Breath sounds normal. Does not exhibit tenderness.   Musculoskeletal: Does not exhibit edema.    Vitals reviewed.    Data Review:     Lab date: 09/17/2024  FLP: , , HDL 27, LDL 33  CMP: Glu 111, BUN 25, Creat 1.29, eGFR 55, Na 137, K 4.7, Cl 98, CO2 23, Ca 9.6, Alk Phos 68, AST 29, ALT 28    Lab Results   Component Value Date    WBC 8.9 06/16/2025    RBC 5.45 06/16/2025    HGB 15.9 06/16/2025    HCT 48.5 06/16/2025    MCV 89 06/16/2025     06/16/2025     Lab Results   Component Value Date    " HGBA1C 6.3 (H) 06/16/2025        Procedures     Advance Care Planning   ACP discussion was held with the patient during this visit. Patient does not have an advance directive, declines further assistance.           Assessment and plan:      Diagnosis Plan   1. Coronary artery disease involving coronary bypass graft of native heart without angina pectoris  Patient with no current angina.  Continue aspirin and Repatha for CAD.  He is statin intolerant.    Discussed signs and symptoms of angina and angina equivalent symptoms and he will call our office if he develops any of these.      2. Essential hypertension  Well-controlled.  Continue Toprol, isosorbide, chlorthalidone at current dose.      3. Dyslipidemia  LDL at target on most recent labs.  Continue Repatha.        Continue current medications.   FU in 6 MO, sooner as needed.  Thank you for allowing us to participate in the care of your patient.         Mariah Cooley PA-C      Please note that portions of this note may have been completed with a voice recognition program. Efforts were made to edit the dictations, but occasionally words are mistranscribed.

## 2025-08-28 ENCOUNTER — SPECIALTY PHARMACY (OUTPATIENT)
Dept: GENERAL RADIOLOGY | Facility: HOSPITAL | Age: 85
End: 2025-08-28
Payer: MEDICARE